# Patient Record
Sex: FEMALE | Race: WHITE | Employment: FULL TIME | URBAN - METROPOLITAN AREA
[De-identification: names, ages, dates, MRNs, and addresses within clinical notes are randomized per-mention and may not be internally consistent; named-entity substitution may affect disease eponyms.]

---

## 2022-03-02 ENCOUNTER — OFFICE VISIT (OUTPATIENT)
Dept: OBGYN CLINIC | Facility: CLINIC | Age: 32
End: 2022-03-02
Payer: COMMERCIAL

## 2022-03-02 VITALS
HEART RATE: 80 BPM | HEIGHT: 63 IN | DIASTOLIC BLOOD PRESSURE: 66 MMHG | BODY MASS INDEX: 24.8 KG/M2 | SYSTOLIC BLOOD PRESSURE: 102 MMHG | WEIGHT: 140 LBS

## 2022-03-02 DIAGNOSIS — N92.6 MISSED MENSES: Primary | ICD-10-CM

## 2022-03-02 LAB
CONTROL LINE PRESENT WITH A CLEAR BACKGROUND (YES/NO): YES YES/NO
PREGNANCY TEST, URINE: POSITIVE

## 2022-03-02 PROCEDURE — 3078F DIAST BP <80 MM HG: CPT | Performed by: ADVANCED PRACTICE MIDWIFE

## 2022-03-02 PROCEDURE — 3074F SYST BP LT 130 MM HG: CPT | Performed by: ADVANCED PRACTICE MIDWIFE

## 2022-03-02 PROCEDURE — 99203 OFFICE O/P NEW LOW 30 MIN: CPT | Performed by: ADVANCED PRACTICE MIDWIFE

## 2022-03-02 PROCEDURE — 81025 URINE PREGNANCY TEST: CPT | Performed by: ADVANCED PRACTICE MIDWIFE

## 2022-03-02 PROCEDURE — 3008F BODY MASS INDEX DOCD: CPT | Performed by: ADVANCED PRACTICE MIDWIFE

## 2022-03-02 RX ORDER — LEVOTHYROXINE SODIUM 0.05 MG/1
TABLET ORAL
COMMUNITY
Start: 2020-01-23

## 2022-03-03 ENCOUNTER — TELEPHONE (OUTPATIENT)
Dept: OBGYN CLINIC | Facility: CLINIC | Age: 32
End: 2022-03-03

## 2022-03-04 ENCOUNTER — MOBILE ENCOUNTER (OUTPATIENT)
Dept: OBGYN CLINIC | Facility: CLINIC | Age: 32
End: 2022-03-04

## 2022-03-04 LAB
HCG, TOTAL, QN: 8012 MIU/ML
PROGESTERONE: 12.3 NG/ML

## 2022-03-05 NOTE — PROGRESS NOTES
Paged by [email or web address] with stat Hcg results. Called patient to review results. Instructed her to repeat in 48 hours and placed order for repeat draw. Answered all patient questions.

## 2022-03-08 LAB — HCG, TOTAL, QN: ABNORMAL MIU/ML

## 2022-03-09 ENCOUNTER — TELEPHONE (OUTPATIENT)
Dept: OBGYN CLINIC | Facility: CLINIC | Age: 32
End: 2022-03-09

## 2022-03-09 ENCOUNTER — PATIENT MESSAGE (OUTPATIENT)
Dept: OBGYN CLINIC | Facility: CLINIC | Age: 32
End: 2022-03-09

## 2022-03-10 ENCOUNTER — HOSPITAL ENCOUNTER (OUTPATIENT)
Dept: ULTRASOUND IMAGING | Facility: HOSPITAL | Age: 32
Discharge: HOME | End: 2022-03-10
Attending: ADVANCED PRACTICE MIDWIFE
Payer: COMMERCIAL

## 2022-03-10 ENCOUNTER — OFFICE VISIT (OUTPATIENT)
Dept: OBGYN CLINIC | Facility: CLINIC | Age: 32
End: 2022-03-10
Payer: COMMERCIAL

## 2022-03-10 ENCOUNTER — LAB ENCOUNTER (OUTPATIENT)
Dept: LAB | Facility: HOSPITAL | Age: 32
End: 2022-03-10
Attending: ADVANCED PRACTICE MIDWIFE
Payer: COMMERCIAL

## 2022-03-10 VITALS
BODY MASS INDEX: 24.8 KG/M2 | WEIGHT: 140 LBS | DIASTOLIC BLOOD PRESSURE: 73 MMHG | SYSTOLIC BLOOD PRESSURE: 109 MMHG | HEART RATE: 66 BPM | HEIGHT: 63 IN

## 2022-03-10 DIAGNOSIS — O36.80X0 PREGNANCY WITH INCONCLUSIVE FETAL VIABILITY, SINGLE OR UNSPECIFIED FETUS: Primary | ICD-10-CM

## 2022-03-10 DIAGNOSIS — O26.891 ABDOMINAL PAIN DURING PREGNANCY IN FIRST TRIMESTER: ICD-10-CM

## 2022-03-10 DIAGNOSIS — N92.6 MISSED MENSES: ICD-10-CM

## 2022-03-10 DIAGNOSIS — R10.9 ABDOMINAL PAIN DURING PREGNANCY IN FIRST TRIMESTER: ICD-10-CM

## 2022-03-10 LAB — B-HCG SERPL-ACNC: ABNORMAL MIU/ML

## 2022-03-10 PROCEDURE — 3074F SYST BP LT 130 MM HG: CPT | Performed by: ADVANCED PRACTICE MIDWIFE

## 2022-03-10 PROCEDURE — 3008F BODY MASS INDEX DOCD: CPT | Performed by: ADVANCED PRACTICE MIDWIFE

## 2022-03-10 PROCEDURE — 76801 OB US < 14 WKS SINGLE FETUS: CPT | Performed by: ADVANCED PRACTICE MIDWIFE

## 2022-03-10 PROCEDURE — 36415 COLL VENOUS BLD VENIPUNCTURE: CPT | Performed by: ADVANCED PRACTICE MIDWIFE

## 2022-03-10 PROCEDURE — 84702 CHORIONIC GONADOTROPIN TEST: CPT | Performed by: ADVANCED PRACTICE MIDWIFE

## 2022-03-10 PROCEDURE — 3078F DIAST BP <80 MM HG: CPT | Performed by: ADVANCED PRACTICE MIDWIFE

## 2022-03-10 PROCEDURE — 76817 TRANSVAGINAL US OBSTETRIC: CPT | Performed by: ADVANCED PRACTICE MIDWIFE

## 2022-03-10 PROCEDURE — 99212 OFFICE O/P EST SF 10 MIN: CPT | Performed by: ADVANCED PRACTICE MIDWIFE

## 2022-03-10 NOTE — TELEPHONE ENCOUNTER
[phone] ([place])  [facility] pt can call cnm on call  Please call in am  Pt should have a CNM visit and hold and call ultrasound abnormal rising HCG  CNM on call notified of message plan and if pt is having pain she should go to an ER

## 2022-03-10 NOTE — TELEPHONE ENCOUNTER
Pt returned pc. Notified of abnormally rising HCG's. In typical normal pregnancy expect to double in 48 hrs. Not all pregnancies will double and can still be normal. Need to r/o potential causes of abnormal rise such as miscarriage/abnormally progressing pregnancy or ectopic. She report the pain she was having has gotten much better. No bleeding. ? Dates. Plan for hold & call tomorrow and to see CNM after. If severe pain to go to ER to r/o ectopic. Agrees with plan. To expect call from nurse tomorrow morning to arrange appointments. RN's- please coordinate hold & call for patient and CNM visit to follow.  Thanks MJ

## 2022-03-10 NOTE — TELEPHONE ENCOUNTER
Spoke to patient, per mj hold and call today. Appt set up for 12:00PM. Instructions given to patient.  Patient to see CNM at 1:00pm. Patient agrees with plan and verbally understands

## 2022-03-14 ENCOUNTER — TELEPHONE (OUTPATIENT)
Dept: OBGYN CLINIC | Facility: CLINIC | Age: 32
End: 2022-03-14

## 2022-03-14 NOTE — TELEPHONE ENCOUNTER
Phone call to patient. Reports she has not had any bleeding or spotting. Still has occasional cramping but nothing that is sustained. She has not scheduled her ultrasound so instructed her to do so for sometime next week. She is to repeat Hcg this week.

## 2022-03-17 ENCOUNTER — TELEPHONE (OUTPATIENT)
Dept: OBGYN CLINIC | Facility: CLINIC | Age: 32
End: 2022-03-17

## 2022-03-17 LAB — HCG, TOTAL, QN: ABNORMAL MIU/ML

## 2022-03-17 NOTE — TELEPHONE ENCOUNTER
Spoke with pt advised of results and KEIKO's rec's. Order for ultrasound changed to stat. Pt agreed and voiced understanding.

## 2022-03-17 NOTE — TELEPHONE ENCOUNTER
----- Message from Wilma Duran CNM sent at 3/17/2022  3:21 PM CDT -----  Please call patient. Her Hcg continues to rise, though not as quickly as we would expect. She should repeat Hcg tomorrow so we can see the 48 hour trend. It looks like she has a follow-up ultrasound scheduled for 3/31. I would ideally like her to go next week, as her last one was 3/10. Please review warning signs. Thanks!

## 2022-03-19 LAB — HCG, TOTAL, QN: ABNORMAL MIU/ML

## 2022-03-22 ENCOUNTER — TELEPHONE (OUTPATIENT)
Dept: OBGYN CLINIC | Facility: CLINIC | Age: 32
End: 2022-03-22

## 2022-03-22 ENCOUNTER — MOBILE ENCOUNTER (OUTPATIENT)
Dept: OBGYN CLINIC | Facility: CLINIC | Age: 32
End: 2022-03-22

## 2022-03-22 NOTE — PROGRESS NOTES
Called patient the review Hcg results. Informed her that level is dropping which indicates a loss. She has an ultrasound today at 2pm and is wondering if she should proceed. I informed her that it would not give us much more information so she does not need to. Patient should go for repeat Hcg and blood type/screen. She is going to discuss with her  and then call office with plan. Reviewed management options. Will discuss further  after next Hcg level or ultrasound. Answered all patient questions.

## 2022-03-22 NOTE — TELEPHONE ENCOUNTER
Per Nedra Angelo, pt's hcg levels are dropping and pt will be calling office to notify if she has decided to proceed with ultrasound scheduled today. Per Nedra Angelo, pt needs to go to lab to repeat bhcg and will also need blood type and antibody screen. Lab orders placed.

## 2022-03-23 LAB — HCG, TOTAL, QN: ABNORMAL MIU/ML

## 2022-03-23 NOTE — TELEPHONE ENCOUNTER
KEIKO lmtcb. Pt will need Rhogam when bleeding.  Should schedule virtual or in person visit today to discuss options w/ cytotec

## 2022-03-23 NOTE — TELEPHONE ENCOUNTER
----- Message from Kevan Lopez CNM sent at 3/23/2022 10:46 AM CDT -----  I tried calling patient and left a voicemail for her to call back. Hcg continues to fall. I had discussed options with her regarding expectant management versus medical yesterday. She was not sure what she wanted to do. She is Rh negative so will need rhogam, either within 72 hours of bleeding or prior to taking miso.

## 2022-03-23 NOTE — TELEPHONE ENCOUNTER
Discussed plan of care & KEIKO recs with pt. Offered virtual or in-person visit today. Pt desires virtual visit w/ KEIKO tomorrow, appt scheduled & instructions given. Emphasized importance of calling office w/ any bleeding & that rhogam should be administered within 72 hours.  Pt verbalized an understanding & agrees w/ plan

## 2022-03-24 ENCOUNTER — TELEMEDICINE (OUTPATIENT)
Dept: OBGYN CLINIC | Facility: CLINIC | Age: 32
End: 2022-03-24

## 2022-03-24 DIAGNOSIS — O26.899 RH NEGATIVE STATE IN ANTEPARTUM PERIOD: ICD-10-CM

## 2022-03-24 DIAGNOSIS — Z67.91 RH NEGATIVE STATE IN ANTEPARTUM PERIOD: ICD-10-CM

## 2022-03-24 DIAGNOSIS — O03.9 MISCARRIAGE: Primary | ICD-10-CM

## 2022-03-24 PROBLEM — E03.9 HYPOTHYROIDISM: Status: ACTIVE | Noted: 2022-03-24

## 2022-03-24 PROCEDURE — 99213 OFFICE O/P EST LOW 20 MIN: CPT | Performed by: ADVANCED PRACTICE MIDWIFE

## 2022-03-24 RX ORDER — MISOPROSTOL 200 UG/1
800 TABLET ORAL ONCE
Qty: 8 TABLET | Refills: 0 | Status: SHIPPED | OUTPATIENT
Start: 2022-03-24 | End: 2022-03-24

## 2022-03-24 NOTE — PROGRESS NOTES
I conducted a telehealth visit with Christina Canela, which was completed using two-way, real-time interactive audio and video communication. This has been done in good burt to provide continuity of care in the best interest of the provider-patient relationship, due to the COVID -19 public health crisis/national emergency where restrictions of face-to-face office visits are ongoing. Every conscious effort was taken to allow for sufficient and adequate time to complete the visit. The patient was made aware of the limitations of the telehealth visit, including treatment limitations as no physical exam could be performed. The patient was advised to call 911 or to go to the ER in case there was an emergency. The patient was also advised of the potential privacy & security concerns related to the telehealth platform. The patient was made aware of where to find [facility] notice of privacy practices, telehealth consent form and other related consent forms and documents. which are located on the [facility] website. The patient verbally agreed to telehealth consent form, related consents and the risks discussed. Lastly, the patient confirmed that they were in [place]. HPI: Video visit conducted with Christina Canela and her . Christina Canela has been followed, initially for abnormally rising Hcg and now falling Hcg. Had ultrasound on 3/10/22 that showed intrauterine gestational sac and yolk sac but no fetal pole. She had some like brown spotting yesterday morning. Denies additional bleeding/spotting, pelvic pain, or fevers. Review of symptoms: See HPI    Physical exam:  Constitutional: Healthy appearing  Neuro: Alert and oriented x 3  Psych: Normal mood. Normal affect. Assessment and Plan:  Missed miscarriage  Rh negative    Counseled on reasons for miscarriage are typically genetic or chromosomal abnormalities that are incompatible with life. Patient counseled on expectant management versus medical management versus D&C.  Patient desires medical management. ional pills. MiscarriaPatient instructed to wet 4 misoprostol pills and insert into vagina. Will likely start cramping and bleeding within 4 hours. If no cramping or bleeding within 4 hours, repeat process with 4 additge will likely involve a small period 2 hours or so of heavy bleeding and cramping combined. Danger signs: heavy bleeding, fever chills, severe pain - to ER for evaluation. Patient verbalized understanding. Follow HCGs weekly until normal.     Discussed recommendation for Rhogam due to Rh negative status. Because ultrasound dated her at 5wks, recommendations are mixed due to low likelihood of mixing of blood types but there is also no harm in Rhogam. Patient instructed to call when she starts bleeding, to schedule nurse visit for rhogam within 72 hours of bleeding. All questions answered 25 mins time in face to face consultation & coordination of care.

## 2022-03-25 ENCOUNTER — TELEPHONE (OUTPATIENT)
Dept: OBGYN CLINIC | Facility: CLINIC | Age: 32
End: 2022-03-25

## 2022-03-25 NOTE — TELEPHONE ENCOUNTER
Spoke to patient, stated she took cytotec last night around 10:00pm and started bleeding at 2:00am. Patient stated her bleeding was heavy with clots. Patient wanted to know if she should take second dose that was prescribed. Advised she does not take the second dose since she started to have heavy bleeding with clots. Patient is coming in tomorrow for Rhogam. Patient advised per mj, she is to repeat hcg levels tomorrow and CNM visit next week along with repeat levels. Standing order placed and appt scheduled with LONNIE on 3/31.  Patient agrees with plan and verbally understands

## 2022-03-25 NOTE — TELEPHONE ENCOUNTER
Pt took subprostile? For miscarriage. Started bleeding 2am on 3/25. Pt has questions.     Please advise

## 2022-03-26 ENCOUNTER — TELEPHONE (OUTPATIENT)
Dept: OBGYN CLINIC | Facility: CLINIC | Age: 32
End: 2022-03-26

## 2022-03-26 ENCOUNTER — NURSE ONLY (OUTPATIENT)
Dept: OBGYN CLINIC | Facility: CLINIC | Age: 32
End: 2022-03-26
Payer: COMMERCIAL

## 2022-03-26 ENCOUNTER — LAB ENCOUNTER (OUTPATIENT)
Dept: LAB | Facility: HOSPITAL | Age: 32
End: 2022-03-26
Attending: ADVANCED PRACTICE MIDWIFE
Payer: COMMERCIAL

## 2022-03-26 DIAGNOSIS — O03.9 SPONTANEOUS ABORTION: Primary | ICD-10-CM

## 2022-03-26 DIAGNOSIS — O02.1 MISSED ABORTION: ICD-10-CM

## 2022-03-26 LAB — B-HCG SERPL-ACNC: 3365 MIU/ML

## 2022-03-26 PROCEDURE — 96372 THER/PROPH/DIAG INJ SC/IM: CPT | Performed by: ADVANCED PRACTICE MIDWIFE

## 2022-03-26 PROCEDURE — 36415 COLL VENOUS BLD VENIPUNCTURE: CPT

## 2022-03-26 PROCEDURE — 84702 CHORIONIC GONADOTROPIN TEST: CPT

## 2022-03-26 NOTE — TELEPHONE ENCOUNTER
Stat Quant results called to office. routed to [facility] on call. Pt had Rhogam inj today. Bleeding has subsided.  Has f/u appt 3/31 w/ MBW & repeat quant

## 2022-03-26 NOTE — PROGRESS NOTES
Prenatal patient in today for Rhogam injection for miscarriage. Had cramping & bleeding yesterday. Passed a large clot & bleeding subsided & cramping resolved. Patient is 8w1d today. No Ultrasound needed. Injection given right ventrogluteal. Patient tolerated well. Rhogam information sheet provided. Patient has follow up appointment scheduled next week. Reviewed SAB precautions. Patient instructed to contact office with any questions or concerns. Patient verbalized understanding.

## 2022-03-28 ENCOUNTER — TELEPHONE (OUTPATIENT)
Dept: OBGYN CLINIC | Facility: CLINIC | Age: 32
End: 2022-03-28

## 2022-03-28 ENCOUNTER — LAB ENCOUNTER (OUTPATIENT)
Dept: LAB | Facility: HOSPITAL | Age: 32
End: 2022-03-28
Attending: ADVANCED PRACTICE MIDWIFE
Payer: COMMERCIAL

## 2022-03-28 ENCOUNTER — OFFICE VISIT (OUTPATIENT)
Dept: OBGYN CLINIC | Facility: CLINIC | Age: 32
End: 2022-03-28
Payer: COMMERCIAL

## 2022-03-28 VITALS
SYSTOLIC BLOOD PRESSURE: 129 MMHG | DIASTOLIC BLOOD PRESSURE: 77 MMHG | HEIGHT: 63 IN | BODY MASS INDEX: 24.03 KG/M2 | WEIGHT: 135.63 LBS | HEART RATE: 51 BPM

## 2022-03-28 DIAGNOSIS — O02.1 MISSED ABORTION: Primary | ICD-10-CM

## 2022-03-28 DIAGNOSIS — R10.2 RIGHT ADNEXAL TENDERNESS: ICD-10-CM

## 2022-03-28 LAB — B-HCG SERPL-ACNC: 1170 MIU/ML

## 2022-03-28 PROCEDURE — 3008F BODY MASS INDEX DOCD: CPT | Performed by: ADVANCED PRACTICE MIDWIFE

## 2022-03-28 PROCEDURE — 99213 OFFICE O/P EST LOW 20 MIN: CPT | Performed by: ADVANCED PRACTICE MIDWIFE

## 2022-03-28 PROCEDURE — 36415 COLL VENOUS BLD VENIPUNCTURE: CPT | Performed by: ADVANCED PRACTICE MIDWIFE

## 2022-03-28 PROCEDURE — 84702 CHORIONIC GONADOTROPIN TEST: CPT | Performed by: ADVANCED PRACTICE MIDWIFE

## 2022-03-28 PROCEDURE — 3074F SYST BP LT 130 MM HG: CPT | Performed by: ADVANCED PRACTICE MIDWIFE

## 2022-03-28 PROCEDURE — 3078F DIAST BP <80 MM HG: CPT | Performed by: ADVANCED PRACTICE MIDWIFE

## 2022-03-28 NOTE — TELEPHONE ENCOUNTER
----- Message from Fabienne Christian CNM sent at 3/28/2022  4:54 PM CDT -----  HCG is dropping - needs repeat ultrasound due to right adnexal pain.  Will need scheduled for tomorrow - please call and assist with scheduling

## 2022-03-28 NOTE — TELEPHONE ENCOUNTER
Spoke with pt advised of lab results and MBW's rec's. Ultrasound order entered as STAT. Pt given phone number for central scheduling. Pt agreed and voiced understanding.

## 2022-03-28 NOTE — TELEPHONE ENCOUNTER
----- Message from Naila Leung CNM sent at 3/28/2022  1:48 PM CDT -----  HCG dropped    She was medically managing her missed ab she should come I  for an appt with a CYNTHIA this week

## 2022-03-29 ENCOUNTER — HOSPITAL ENCOUNTER (OUTPATIENT)
Dept: ULTRASOUND IMAGING | Facility: HOSPITAL | Age: 32
Discharge: HOME | End: 2022-03-29
Attending: ADVANCED PRACTICE MIDWIFE
Payer: COMMERCIAL

## 2022-03-29 DIAGNOSIS — R10.2 RIGHT ADNEXAL TENDERNESS: ICD-10-CM

## 2022-03-29 PROCEDURE — 76817 TRANSVAGINAL US OBSTETRIC: CPT | Performed by: ADVANCED PRACTICE MIDWIFE

## 2022-03-29 PROCEDURE — 76801 OB US < 14 WKS SINGLE FETUS: CPT | Performed by: ADVANCED PRACTICE MIDWIFE

## 2022-03-30 ENCOUNTER — TELEPHONE (OUTPATIENT)
Dept: OBGYN CLINIC | Facility: CLINIC | Age: 32
End: 2022-03-30

## 2022-03-30 NOTE — TELEPHONE ENCOUNTER
Notified pt of results & instructions per MBW. Pt reports bleeding is now like day 4 of her period with one raúl sized clot & still cramping. Instructed to take ibuprofen as needed & notify office if cramping &/or worsens or does not resolve.  Pt verbalized an understanding & agrees w/ plan

## 2022-03-30 NOTE — TELEPHONE ENCOUNTER
----- Message from Sierra Saul CNM sent at 3/30/2022  9:50 AM CDT -----  Looks like normal results and everything has passed. Anticipate resolution of cramping. Please call.

## 2022-04-01 ENCOUNTER — TELEPHONE (OUTPATIENT)
Dept: OBGYN CLINIC | Facility: CLINIC | Age: 32
End: 2022-04-01

## 2022-04-01 LAB — HCG, TOTAL, QN: 508 MIU/ML

## 2022-04-01 NOTE — TELEPHONE ENCOUNTER
----- Message from Terra Tomas CNM sent at 4/1/2022 12:33 PM CDT -----  Dropped HCG to 500 repeat in 1 - 2 weeks  Check to see how se is oing  Offer an appt if needed

## 2022-04-01 NOTE — TELEPHONE ENCOUNTER
Notified pt of results & instructions per MES. Pt states she completed labs only because she was doing others at that time. Pt states her bleeding has almost stopped & her cramping has disappeared. Declines f/u appt for now but will call w/ any concerns.  Pt verbalized an understanding & agrees w/ plan

## 2022-04-13 ENCOUNTER — TELEPHONE (OUTPATIENT)
Dept: OBGYN CLINIC | Facility: CLINIC | Age: 32
End: 2022-04-13

## 2022-04-13 NOTE — TELEPHONE ENCOUNTER
Patient is at [facility] now, requesting order for lab to be send through Fax [phone],[employer].

## 2022-04-14 ENCOUNTER — TELEPHONE (OUTPATIENT)
Dept: OBGYN CLINIC | Facility: CLINIC | Age: 32
End: 2022-04-14

## 2022-04-14 LAB — HCG, TOTAL, QN: 7 MIU/ML

## 2022-04-14 NOTE — TELEPHONE ENCOUNTER
----- Message from Byron Lanier CNM sent at 4/14/2022  6:37 AM CDT -----  HCG down to 7 less than 5 is normal  repeat in 2 weeks

## 2022-04-15 NOTE — TELEPHONE ENCOUNTER
Notified pt of results & instructions per MES.  Order placed at [facility]. Pt verbalized an understanding & agrees w/ plan

## 2022-05-16 ENCOUNTER — TELEPHONE (OUTPATIENT)
Dept: OBGYN CLINIC | Facility: CLINIC | Age: 32
End: 2022-05-16

## 2022-05-16 NOTE — TELEPHONE ENCOUNTER
Spoke with pt and reminded pt of overdue bhcg. Pt agreed and voiced understanding, stated she will go to [facility] to complete.

## 2022-06-16 ENCOUNTER — TELEPHONE (OUTPATIENT)
Dept: OBGYN CLINIC | Facility: CLINIC | Age: 32
End: 2022-06-16

## 2022-07-19 ENCOUNTER — TELEPHONE (OUTPATIENT)
Dept: OBGYN CLINIC | Facility: CLINIC | Age: 32
End: 2022-07-19

## 2022-08-08 ENCOUNTER — TELEPHONE (OUTPATIENT)
Dept: OBGYN CLINIC | Facility: CLINIC | Age: 32
End: 2022-08-08

## 2022-08-08 ENCOUNTER — LAB ENCOUNTER (OUTPATIENT)
Dept: LAB | Age: 32
End: 2022-08-08
Attending: ADVANCED PRACTICE MIDWIFE
Payer: COMMERCIAL

## 2022-08-08 ENCOUNTER — OFFICE VISIT (OUTPATIENT)
Dept: OBGYN CLINIC | Facility: CLINIC | Age: 32
End: 2022-08-08
Payer: COMMERCIAL

## 2022-08-08 VITALS
SYSTOLIC BLOOD PRESSURE: 120 MMHG | DIASTOLIC BLOOD PRESSURE: 75 MMHG | HEART RATE: 65 BPM | BODY MASS INDEX: 24.45 KG/M2 | WEIGHT: 138 LBS | HEIGHT: 63 IN

## 2022-08-08 DIAGNOSIS — Z83.2 FAMILY HISTORY OF FACTOR V LEIDEN MUTATION: ICD-10-CM

## 2022-08-08 DIAGNOSIS — Z87.59 HISTORY OF MISCARRIAGE: ICD-10-CM

## 2022-08-08 DIAGNOSIS — N92.6 MISSED MENSES: ICD-10-CM

## 2022-08-08 DIAGNOSIS — N92.6 MISSED MENSES: Primary | ICD-10-CM

## 2022-08-08 LAB
B-HCG SERPL-ACNC: ABNORMAL MIU/ML
CONTROL LINE PRESENT WITH A CLEAR BACKGROUND (YES/NO): YES YES/NO
PREGNANCY TEST, URINE: POSITIVE
PROGEST SERPL-MCNC: 16.2 NG/ML

## 2022-08-08 PROCEDURE — 99213 OFFICE O/P EST LOW 20 MIN: CPT | Performed by: ADVANCED PRACTICE MIDWIFE

## 2022-08-08 PROCEDURE — 3008F BODY MASS INDEX DOCD: CPT | Performed by: ADVANCED PRACTICE MIDWIFE

## 2022-08-08 PROCEDURE — 3074F SYST BP LT 130 MM HG: CPT | Performed by: ADVANCED PRACTICE MIDWIFE

## 2022-08-08 PROCEDURE — 84144 ASSAY OF PROGESTERONE: CPT | Performed by: ADVANCED PRACTICE MIDWIFE

## 2022-08-08 PROCEDURE — 3078F DIAST BP <80 MM HG: CPT | Performed by: ADVANCED PRACTICE MIDWIFE

## 2022-08-08 PROCEDURE — 81025 URINE PREGNANCY TEST: CPT | Performed by: ADVANCED PRACTICE MIDWIFE

## 2022-08-08 PROCEDURE — 36415 COLL VENOUS BLD VENIPUNCTURE: CPT

## 2022-08-08 PROCEDURE — 84702 CHORIONIC GONADOTROPIN TEST: CPT

## 2022-08-08 RX ORDER — CHOLECALCIFEROL (VITAMIN D3) 25 MCG
1 TABLET,CHEWABLE ORAL DAILY
COMMUNITY

## 2022-08-08 NOTE — TELEPHONE ENCOUNTER
----- Message from Ben Cho CNM sent at 8/8/2022  4:03 PM CDT -----  Please call patient and let her know progesterone level is good. She needs to repeat Hcg in 48 hours and can schedule an ultrasound. I will also place an order for her to get tested for factor V leiden. She can go to any lab, though her insurance prefers [employer]. Thanks!

## 2022-08-11 ENCOUNTER — TELEPHONE (OUTPATIENT)
Dept: OBGYN CLINIC | Facility: CLINIC | Age: 32
End: 2022-08-11

## 2022-08-11 ENCOUNTER — LAB ENCOUNTER (OUTPATIENT)
Dept: LAB | Facility: HOSPITAL | Age: 32
End: 2022-08-11
Attending: ADVANCED PRACTICE MIDWIFE
Payer: COMMERCIAL

## 2022-08-11 ENCOUNTER — HOSPITAL ENCOUNTER (OUTPATIENT)
Dept: ULTRASOUND IMAGING | Facility: HOSPITAL | Age: 32
End: 2022-08-11
Attending: ADVANCED PRACTICE MIDWIFE
Payer: COMMERCIAL

## 2022-08-11 ENCOUNTER — HOSPITAL ENCOUNTER (OUTPATIENT)
Dept: ULTRASOUND IMAGING | Facility: HOSPITAL | Age: 32
Discharge: HOME | End: 2022-08-11
Attending: ADVANCED PRACTICE MIDWIFE
Payer: COMMERCIAL

## 2022-08-11 DIAGNOSIS — Z87.59 HISTORY OF MISCARRIAGE: ICD-10-CM

## 2022-08-11 DIAGNOSIS — N92.6 MISSED MENSES: Primary | ICD-10-CM

## 2022-08-11 DIAGNOSIS — N92.6 MISSED MENSES: ICD-10-CM

## 2022-08-11 LAB
ALBUMIN SERPL-MCNC: 3.6 G/DL (ref 3.4–5)
ALBUMIN/GLOB SERPL: 1.1 {RATIO} (ref 1–2)
ALP LIVER SERPL-CCNC: 43 U/L
ALT SERPL-CCNC: 27 U/L
ANION GAP SERPL CALC-SCNC: 8 MMOL/L (ref 0–18)
AST SERPL-CCNC: 18 U/L (ref 15–37)
B-HCG SERPL-ACNC: ABNORMAL MIU/ML
BILIRUB SERPL-MCNC: 0.4 MG/DL (ref 0.1–2)
BUN BLD-MCNC: 8 MG/DL (ref 7–18)
BUN/CREAT SERPL: 13.8 (ref 10–20)
CALCIUM BLD-MCNC: 8.9 MG/DL (ref 8.5–10.1)
CHLORIDE SERPL-SCNC: 106 MMOL/L (ref 98–112)
CO2 SERPL-SCNC: 25 MMOL/L (ref 21–32)
CREAT BLD-MCNC: 0.58 MG/DL
F5 P.R506Q BLD/T QL: NORMAL
FASTING STATUS PATIENT QL REPORTED: NO
GFR SERPLBLD BASED ON 1.73 SQ M-ARVRAT: 125 ML/MIN/1.73M2 (ref 60–?)
GLOBULIN PLAS-MCNC: 3.2 G/DL (ref 2.8–4.4)
GLUCOSE BLD-MCNC: 108 MG/DL (ref 70–99)
OSMOLALITY SERPL CALC.SUM OF ELEC: 287 MOSM/KG (ref 275–295)
POTASSIUM SERPL-SCNC: 3.3 MMOL/L (ref 3.5–5.1)
PROT SERPL-MCNC: 6.8 G/DL (ref 6.4–8.2)
SODIUM SERPL-SCNC: 139 MMOL/L (ref 136–145)

## 2022-08-11 PROCEDURE — 36415 COLL VENOUS BLD VENIPUNCTURE: CPT | Performed by: ADVANCED PRACTICE MIDWIFE

## 2022-08-11 PROCEDURE — 76801 OB US < 14 WKS SINGLE FETUS: CPT | Performed by: ADVANCED PRACTICE MIDWIFE

## 2022-08-11 PROCEDURE — 81241 F5 GENE: CPT | Performed by: ADVANCED PRACTICE MIDWIFE

## 2022-08-11 PROCEDURE — 84702 CHORIONIC GONADOTROPIN TEST: CPT

## 2022-08-11 PROCEDURE — 80053 COMPREHEN METABOLIC PANEL: CPT | Performed by: ADVANCED PRACTICE MIDWIFE

## 2022-08-11 PROCEDURE — 76817 TRANSVAGINAL US OBSTETRIC: CPT | Performed by: ADVANCED PRACTICE MIDWIFE

## 2022-08-11 NOTE — PROGRESS NOTES
Phone call to patient to review Hcg levels. Levels did not double over 48-72 hours but did rise. (38,226 to 56,421). Discussed high level of Hcg and that they do not always double at this level. Stat ultrasound ordered for further evaluation. Patient instructed to call to schedule.

## 2022-08-11 NOTE — TELEPHONE ENCOUNTER
----- Message from Nancy Khan CNM sent at 8/11/2022  4:37 PM CDT -----  Please call patient and let her know they did draw factor v Leiden today and it was normal. I already called her about other lab results.

## 2022-08-12 ENCOUNTER — TELEPHONE (OUTPATIENT)
Dept: OBGYN CLINIC | Facility: CLINIC | Age: 32
End: 2022-08-12

## 2022-08-12 NOTE — TELEPHONE ENCOUNTER
Phone call to patient to review normal ultrasound with IUP at 6.2wks. NABILA 4/4/23 based on ultrasound and consistent with LMP with 30 day cycles. Patient has nurse education visit tomorrow. Reviewed warning signs and when to call.

## 2022-09-01 ENCOUNTER — NURSE ONLY (OUTPATIENT)
Dept: OBGYN CLINIC | Facility: CLINIC | Age: 32
End: 2022-09-01

## 2022-09-01 DIAGNOSIS — Z34.81 ENCOUNTER FOR SUPERVISION OF OTHER NORMAL PREGNANCY IN FIRST TRIMESTER: Primary | ICD-10-CM

## 2022-09-01 RX ORDER — CHLORAL HYDRATE 500 MG
CAPSULE ORAL
COMMUNITY

## 2022-09-01 NOTE — PROGRESS NOTES
Phone OB RN education visit completed, educational material reviewed. Pt verbalized understanding. Orders placed for NOB labs. Pt states her last Pap smear was in  and was abnormal. Pt undecided about genetic screening. Pt will complete EPDS and ROOPA-7 during next appt. Pt was scheduled for NOB appt. Pt desires natural birth. Pt. Has answered NO 5P questions and has NO  risk factors. Pt. Given What pregnant women need to know handout. Pt counseled on ACOG & ACNM guidelines recommending carrier testing. Carrier Testing, including Hemoglobin Evaluation offered through patient & insurance choice of [employer] or [employer]. Pt undecided.

## 2022-09-09 ENCOUNTER — PATIENT MESSAGE (OUTPATIENT)
Dept: OBGYN CLINIC | Facility: CLINIC | Age: 32
End: 2022-09-09

## 2022-09-09 DIAGNOSIS — Z34.80 PRENATAL CARE OF MULTIGRAVIDA, ANTEPARTUM: Primary | ICD-10-CM

## 2022-09-13 NOTE — TELEPHONE ENCOUNTER
All questions answered. Pt decided to complete FTS. Call transferred to [facility].  Pt verbalized an understanding & agrees w/ plan

## 2022-09-14 ENCOUNTER — TELEMEDICINE (OUTPATIENT)
Dept: OBGYN CLINIC | Facility: CLINIC | Age: 32
End: 2022-09-14

## 2022-09-14 DIAGNOSIS — Z34.81 PRENATAL CARE, SUBSEQUENT PREGNANCY IN FIRST TRIMESTER: Primary | ICD-10-CM

## 2022-09-14 PROBLEM — U07.1 COVID-19 AFFECTING PREGNANCY IN FIRST TRIMESTER: Status: ACTIVE | Noted: 2022-09-14

## 2022-09-14 PROBLEM — O98.511 COVID-19 AFFECTING PREGNANCY IN FIRST TRIMESTER: Status: ACTIVE | Noted: 2022-09-14

## 2022-09-14 NOTE — PROGRESS NOTES
This visit is conducted using Telemedicine with live, interactive video and audio. Patient has elected a telehealth video visit and consents to care by this method today. Pt is at home and provider is in [facility] office. No other persons participated in the telehealth service today. Patient has been referred to the [facility] website at [email or web address].org/consents to review the yearly Consent to Treat document. Patient understands and accepts financial responsibility for any deductible, co-insurance and/or co-pays associated with this service. Duration of telehealth visit was 11 minutes. In total, 15 minutes of face-to-face counseling, chart review, orders and coordination of care. Miladis Salazar is present by video today because she tested positive for COVID. States her symptoms are like a bad cold. No fever. No additional SOB beyond what pregnancy has already brought on. She is isolating from her . No other household members. She is taking vitamin D and C. She will add Zinc and Melatonin. Patient Active Problem List:     Hypothyroid     COVID-19 affecting pregnancy in first trimester       Physical Exam:  General:  Alert and oriented, No acute distress  Respiratory:  Non-labor respirations  Psychiatric: Cooperative, appropriate mood and affect    Today's plan: Comfort measures reviewed. To ED if SOB  Next visit: 4 weeks    Pt verbalized understanding. All questions answered.  No barriers to learning identified

## 2022-09-23 ENCOUNTER — HOSPITAL ENCOUNTER (OUTPATIENT)
Dept: PERINATAL CARE | Facility: HOSPITAL | Age: 32
Discharge: HOME | End: 2022-09-23
Attending: OBSTETRICS & GYNECOLOGY

## 2022-09-23 ENCOUNTER — HOSPITAL ENCOUNTER (OUTPATIENT)
Dept: PERINATAL CARE | Facility: HOSPITAL | Age: 32
Discharge: HOME | End: 2022-09-23
Attending: ADVANCED PRACTICE MIDWIFE

## 2022-09-23 VITALS
BODY MASS INDEX: 25 KG/M2 | SYSTOLIC BLOOD PRESSURE: 131 MMHG | WEIGHT: 140 LBS | HEART RATE: 66 BPM | DIASTOLIC BLOOD PRESSURE: 81 MMHG

## 2022-09-23 DIAGNOSIS — O98.511 COVID-19 AFFECTING PREGNANCY IN FIRST TRIMESTER: ICD-10-CM

## 2022-09-23 DIAGNOSIS — U07.1 COVID-19 AFFECTING PREGNANCY IN FIRST TRIMESTER: ICD-10-CM

## 2022-09-23 DIAGNOSIS — E03.9 HYPOTHYROIDISM, UNSPECIFIED TYPE: ICD-10-CM

## 2022-09-23 DIAGNOSIS — Z36.9 1ST TRIMESTER SCREENING: Primary | ICD-10-CM

## 2022-09-23 DIAGNOSIS — Z36.9 1ST TRIMESTER SCREENING: ICD-10-CM

## 2022-09-23 PROCEDURE — 36415 COLL VENOUS BLD VENIPUNCTURE: CPT

## 2022-09-23 PROCEDURE — 76813 OB US NUCHAL MEAS 1 GEST: CPT | Performed by: OBSTETRICS & GYNECOLOGY

## 2022-09-28 ENCOUNTER — ROUTINE PRENATAL (OUTPATIENT)
Dept: OBGYN CLINIC | Facility: CLINIC | Age: 32
End: 2022-09-28

## 2022-09-28 VITALS
HEART RATE: 60 BPM | SYSTOLIC BLOOD PRESSURE: 115 MMHG | BODY MASS INDEX: 25 KG/M2 | WEIGHT: 141 LBS | DIASTOLIC BLOOD PRESSURE: 60 MMHG

## 2022-09-28 DIAGNOSIS — Z34.81 ENCOUNTER FOR SUPERVISION OF OTHER NORMAL PREGNANCY IN FIRST TRIMESTER: Primary | ICD-10-CM

## 2022-09-28 PROCEDURE — 3078F DIAST BP <80 MM HG: CPT | Performed by: ADVANCED PRACTICE MIDWIFE

## 2022-09-28 PROCEDURE — 3074F SYST BP LT 130 MM HG: CPT | Performed by: ADVANCED PRACTICE MIDWIFE

## 2022-09-29 NOTE — PROGRESS NOTES
NOB teaching and labs reviewed MFM report and recommendations reviewed  Pt will be seeing PCP - re thyroid   Warning signs reviewed and all questions answered

## 2022-10-03 ENCOUNTER — TELEPHONE (OUTPATIENT)
Dept: PERINATAL CARE | Facility: HOSPITAL | Age: 32
End: 2022-10-03

## 2022-10-03 NOTE — TELEPHONE ENCOUNTER
Panorama screening results obt  Reviewed by DR Justo Montaño    Results:  low risk  Low Risk for Aneuploidies, triploidy and Monosomy X  Fetal Sex: Held for   Fetal Fraction: 12.6%     LVMW#TCB   Copy of results sent for scanning into pt record

## 2022-10-04 ENCOUNTER — TELEPHONE (OUTPATIENT)
Dept: PERINATAL CARE | Facility: HOSPITAL | Age: 32
End: 2022-10-04

## 2022-10-04 ENCOUNTER — TELEPHONE (OUTPATIENT)
Dept: OBGYN CLINIC | Facility: CLINIC | Age: 32
End: 2022-10-04

## 2022-10-05 NOTE — TELEPHONE ENCOUNTER
Spoke to patient, advised overdue labs. Patient stated she was not aware she had to have any labs done and was waiting for someone to give instructions. Patient advised orders already in the system and does not require any appt. Patient on vacation this week but will do next week once she is back.  Patient agrees with plan and verbally understands

## 2022-10-10 ENCOUNTER — LAB ENCOUNTER (OUTPATIENT)
Dept: LAB | Facility: HOSPITAL | Age: 32
End: 2022-10-10
Attending: ADVANCED PRACTICE MIDWIFE
Payer: COMMERCIAL

## 2022-10-10 DIAGNOSIS — Z34.81 ENCOUNTER FOR SUPERVISION OF OTHER NORMAL PREGNANCY IN FIRST TRIMESTER: ICD-10-CM

## 2022-10-10 LAB
ALBUMIN SERPL-MCNC: 3.1 G/DL (ref 3.4–5)
ALBUMIN/GLOB SERPL: 0.8 {RATIO} (ref 1–2)
ALP LIVER SERPL-CCNC: 62 U/L
ALT SERPL-CCNC: 24 U/L
ANION GAP SERPL CALC-SCNC: 11 MMOL/L (ref 0–18)
ANTIBODY SCREEN: NEGATIVE
AST SERPL-CCNC: 18 U/L (ref 15–37)
BASOPHILS # BLD AUTO: 0.02 X10(3) UL (ref 0–0.2)
BASOPHILS NFR BLD AUTO: 0.3 %
BILIRUB SERPL-MCNC: 0.6 MG/DL (ref 0.1–2)
BUN BLD-MCNC: 5 MG/DL (ref 7–18)
BUN/CREAT SERPL: 9.1 (ref 10–20)
CALCIUM BLD-MCNC: 8.9 MG/DL (ref 8.5–10.1)
CHLORIDE SERPL-SCNC: 108 MMOL/L (ref 98–112)
CO2 SERPL-SCNC: 19 MMOL/L (ref 21–32)
CREAT BLD-MCNC: 0.55 MG/DL
DEPRECATED HBV CORE AB SER IA-ACNC: 48.4 NG/ML
DEPRECATED RDW RBC AUTO: 44.9 FL (ref 35.1–46.3)
EOSINOPHIL # BLD AUTO: 0.07 X10(3) UL (ref 0–0.7)
EOSINOPHIL NFR BLD AUTO: 0.9 %
ERYTHROCYTE [DISTWIDTH] IN BLOOD BY AUTOMATED COUNT: 14 % (ref 11–15)
FASTING STATUS PATIENT QL REPORTED: NO
GFR SERPLBLD BASED ON 1.73 SQ M-ARVRAT: 126 ML/MIN/1.73M2 (ref 60–?)
GLOBULIN PLAS-MCNC: 3.7 G/DL (ref 2.8–4.4)
GLUCOSE BLD-MCNC: 118 MG/DL (ref 70–99)
HBV SURFACE AG SER-ACNC: <0.1 [IU]/L
HBV SURFACE AG SERPL QL IA: NONREACTIVE
HCT VFR BLD AUTO: 42.6 %
HCV AB SERPL QL IA: NONREACTIVE
HGB BLD-MCNC: 14.3 G/DL
IMM GRANULOCYTES # BLD AUTO: 0.03 X10(3) UL (ref 0–1)
IMM GRANULOCYTES NFR BLD: 0.4 %
LYMPHOCYTES # BLD AUTO: 1.35 X10(3) UL (ref 1–4)
LYMPHOCYTES NFR BLD AUTO: 17.3 %
MCH RBC QN AUTO: 29.4 PG (ref 26–34)
MCHC RBC AUTO-ENTMCNC: 33.6 G/DL (ref 31–37)
MCV RBC AUTO: 87.7 FL
MONOCYTES # BLD AUTO: 0.4 X10(3) UL (ref 0.1–1)
MONOCYTES NFR BLD AUTO: 5.1 %
NEUTROPHILS # BLD AUTO: 5.92 X10 (3) UL (ref 1.5–7.7)
NEUTROPHILS # BLD AUTO: 5.92 X10(3) UL (ref 1.5–7.7)
NEUTROPHILS NFR BLD AUTO: 76 %
OSMOLALITY SERPL CALC.SUM OF ELEC: 284 MOSM/KG (ref 275–295)
PLATELET # BLD AUTO: 258 10(3)UL (ref 150–450)
POTASSIUM SERPL-SCNC: 3.7 MMOL/L (ref 3.5–5.1)
PROT SERPL-MCNC: 6.8 G/DL (ref 6.4–8.2)
RBC # BLD AUTO: 4.86 X10(6)UL
RH BLOOD TYPE: NEGATIVE
RUBV IGG SER QL: POSITIVE
RUBV IGG SER-ACNC: 109.7 IU/ML (ref 10–?)
SODIUM SERPL-SCNC: 138 MMOL/L (ref 136–145)
TSI SER-ACNC: 0.35 MIU/ML (ref 0.36–3.74)
WBC # BLD AUTO: 7.8 X10(3) UL (ref 4–11)

## 2022-10-10 PROCEDURE — 82728 ASSAY OF FERRITIN: CPT | Performed by: ADVANCED PRACTICE MIDWIFE

## 2022-10-10 PROCEDURE — 87086 URINE CULTURE/COLONY COUNT: CPT | Performed by: ADVANCED PRACTICE MIDWIFE

## 2022-10-10 PROCEDURE — 85025 COMPLETE CBC W/AUTO DIFF WBC: CPT | Performed by: ADVANCED PRACTICE MIDWIFE

## 2022-10-10 PROCEDURE — 86850 RBC ANTIBODY SCREEN: CPT

## 2022-10-10 PROCEDURE — 86803 HEPATITIS C AB TEST: CPT | Performed by: ADVANCED PRACTICE MIDWIFE

## 2022-10-10 PROCEDURE — 86762 RUBELLA ANTIBODY: CPT | Performed by: ADVANCED PRACTICE MIDWIFE

## 2022-10-10 PROCEDURE — 87389 HIV-1 AG W/HIV-1&-2 AB AG IA: CPT | Performed by: ADVANCED PRACTICE MIDWIFE

## 2022-10-10 PROCEDURE — 80053 COMPREHEN METABOLIC PANEL: CPT | Performed by: ADVANCED PRACTICE MIDWIFE

## 2022-10-10 PROCEDURE — 86900 BLOOD TYPING SEROLOGIC ABO: CPT | Performed by: ADVANCED PRACTICE MIDWIFE

## 2022-10-10 PROCEDURE — 84443 ASSAY THYROID STIM HORMONE: CPT | Performed by: ADVANCED PRACTICE MIDWIFE

## 2022-10-10 PROCEDURE — 36415 COLL VENOUS BLD VENIPUNCTURE: CPT | Performed by: ADVANCED PRACTICE MIDWIFE

## 2022-10-10 PROCEDURE — 87340 HEPATITIS B SURFACE AG IA: CPT | Performed by: ADVANCED PRACTICE MIDWIFE

## 2022-10-10 PROCEDURE — 86901 BLOOD TYPING SEROLOGIC RH(D): CPT | Performed by: ADVANCED PRACTICE MIDWIFE

## 2022-10-10 PROCEDURE — 86780 TREPONEMA PALLIDUM: CPT | Performed by: ADVANCED PRACTICE MIDWIFE

## 2022-10-12 LAB — T PALLIDUM AB SER QL: NEGATIVE

## 2022-10-14 ENCOUNTER — TELEPHONE (OUTPATIENT)
Dept: OBGYN CLINIC | Facility: CLINIC | Age: 32
End: 2022-10-14

## 2022-10-14 PROBLEM — O26.899 RH NEGATIVE STATE IN ANTEPARTUM PERIOD: Status: ACTIVE | Noted: 2022-10-14

## 2022-10-14 PROBLEM — Z67.91 RH NEGATIVE STATE IN ANTEPARTUM PERIOD: Status: ACTIVE | Noted: 2022-10-14

## 2022-10-14 NOTE — TELEPHONE ENCOUNTER
----- Message from Keri Saint, CNM sent at 10/14/2022  8:36 AM CDT -----  Please call patient. TSH was slightly low. She should follow-up with whoever manages her hypothyroidism. Rh negative. Remaining labs normal. HIV negative. Still needs to complete 24-hr urine. Thanks!

## 2022-10-14 NOTE — TELEPHONE ENCOUNTER
Notified pt of results & instructions per Pili Belle. Pt has f/u labs to complete for the provider that is managing her thyroid. Will complete her 24hr urine on Tuesday.  Pt verbalized an understanding & agrees w/ plan

## 2022-10-19 ENCOUNTER — LAB ENCOUNTER (OUTPATIENT)
Dept: LAB | Facility: HOSPITAL | Age: 32
End: 2022-10-19
Attending: ADVANCED PRACTICE MIDWIFE
Payer: COMMERCIAL

## 2022-10-19 LAB
M PROTEIN 24H UR ELPH-MRATE: 174.6 MG/24 HR (ref ?–149.1)
SPECIMEN VOL UR: 1800 ML

## 2022-10-19 PROCEDURE — 84156 ASSAY OF PROTEIN URINE: CPT | Performed by: ADVANCED PRACTICE MIDWIFE

## 2022-10-24 ENCOUNTER — ROUTINE PRENATAL (OUTPATIENT)
Dept: OBGYN CLINIC | Facility: CLINIC | Age: 32
End: 2022-10-24
Payer: COMMERCIAL

## 2022-10-24 VITALS — BODY MASS INDEX: 25 KG/M2 | SYSTOLIC BLOOD PRESSURE: 109 MMHG | DIASTOLIC BLOOD PRESSURE: 70 MMHG | WEIGHT: 142 LBS

## 2022-10-24 DIAGNOSIS — Z34.02 ENCOUNTER FOR SUPERVISION OF NORMAL FIRST PREGNANCY IN SECOND TRIMESTER: Primary | ICD-10-CM

## 2022-10-24 PROCEDURE — 3078F DIAST BP <80 MM HG: CPT | Performed by: ADVANCED PRACTICE MIDWIFE

## 2022-10-24 PROCEDURE — 3074F SYST BP LT 130 MM HG: CPT | Performed by: ADVANCED PRACTICE MIDWIFE

## 2022-10-24 RX ORDER — THYROID,PORK 90 MG
100 TABLET ORAL DAILY
COMMUNITY
Start: 2022-10-10

## 2022-10-24 NOTE — PROGRESS NOTES
Chitra Kristin has had some ongoing nausea. Denies vomiting. States it is manageable. Discussed smaller, more frequent meals. Nausea could also be due to acid/heartburn. Suggested trying Tums if occurring after meals. Denies vaginal bleeding or cramping. Has anatomy scan scheduled. Reviewed warning signs and when to call.  TEODORA 4wks

## 2022-11-17 ENCOUNTER — HOSPITAL ENCOUNTER (OUTPATIENT)
Dept: PERINATAL CARE | Facility: HOSPITAL | Age: 32
Discharge: HOME | End: 2022-11-17
Attending: OBSTETRICS & GYNECOLOGY
Payer: COMMERCIAL

## 2022-11-17 ENCOUNTER — ROUTINE PRENATAL (OUTPATIENT)
Dept: OBGYN CLINIC | Facility: CLINIC | Age: 32
End: 2022-11-17
Payer: COMMERCIAL

## 2022-11-17 VITALS
BODY MASS INDEX: 26 KG/M2 | DIASTOLIC BLOOD PRESSURE: 69 MMHG | SYSTOLIC BLOOD PRESSURE: 120 MMHG | WEIGHT: 146 LBS | HEART RATE: 71 BPM

## 2022-11-17 VITALS
SYSTOLIC BLOOD PRESSURE: 126 MMHG | BODY MASS INDEX: 26 KG/M2 | DIASTOLIC BLOOD PRESSURE: 64 MMHG | WEIGHT: 145 LBS | HEART RATE: 60 BPM

## 2022-11-17 DIAGNOSIS — Z34.02 ENCOUNTER FOR SUPERVISION OF NORMAL FIRST PREGNANCY IN SECOND TRIMESTER: Primary | ICD-10-CM

## 2022-11-17 DIAGNOSIS — Z36.3 SCREENING, ANTENATAL, FOR MALFORMATION BY ULTRASOUND: ICD-10-CM

## 2022-11-17 DIAGNOSIS — O09.612 HIGH-RISK PREGNANCY, YOUNG PRIMIGRAVIDA IN SECOND TRIMESTER: ICD-10-CM

## 2022-11-17 DIAGNOSIS — E03.9 HYPOTHYROIDISM, UNSPECIFIED TYPE: ICD-10-CM

## 2022-11-17 DIAGNOSIS — U07.1 COVID-19 AFFECTING PREGNANCY IN FIRST TRIMESTER: Primary | ICD-10-CM

## 2022-11-17 DIAGNOSIS — O98.511 COVID-19 AFFECTING PREGNANCY IN FIRST TRIMESTER: Primary | ICD-10-CM

## 2022-11-17 DIAGNOSIS — U07.1 COVID-19 AFFECTING PREGNANCY IN FIRST TRIMESTER: ICD-10-CM

## 2022-11-17 DIAGNOSIS — O98.511 COVID-19 AFFECTING PREGNANCY IN FIRST TRIMESTER: ICD-10-CM

## 2022-11-17 PROCEDURE — 3074F SYST BP LT 130 MM HG: CPT | Performed by: ADVANCED PRACTICE MIDWIFE

## 2022-11-17 PROCEDURE — 3078F DIAST BP <80 MM HG: CPT | Performed by: ADVANCED PRACTICE MIDWIFE

## 2022-11-17 PROCEDURE — 76811 OB US DETAILED SNGL FETUS: CPT | Performed by: OBSTETRICS & GYNECOLOGY

## 2022-11-17 NOTE — PROGRESS NOTES
Feeling well. Has some lingering nausea occasionally but it is much improved. Believes she may be starting to feel fetal movement. Denies LOF, vaginal bleeding, cramping. Has anatomy scan today. Discussed childbirth education classes. Reviewed warning signs and when to call.  TEODORA 4wks

## 2022-11-17 NOTE — PATIENT INSTRUCTIONS
Childbirth Education Resources    HYPNOBIRTHING  [facility] Bab[facility] www.blissfulbirthsandbabies. [employer]ocasts [phone]  Vasyl, [place] END, [address], [facility], [facility], [place], [facility] FloydFara. Fl[employer]asts  [phone]    [address] [facility]/The Birth Zone www. thebirthzone. [employer]  [phone]    EVIDENCE BASED BIRTH  [facility]   www.sim4tecbirth.Flocasts         Cheral Brought  www.birthIntrakrchristine. [employer]  [phone]  [place] www. [employer]  [phone]      [facility]  www.Wantworthy.Flocasts          Paul Guo  www.Wanjee Operation and Maintenancebirth.Flocasts/caroljerman [phone]  [address] [place] [address]  www.Wan[employer] Operation and Maintenancebirth.Flocasts/nehemiasepotts [phone]  Lynsagar Moy  www.Wan[employer] Operation and Maintenancebirth.Flocasts/marissalukas [phone]  D[employer]R  VYCH Bicske  www.[employer]communication.Flocasts/lisaupham [phone]  Sueanne Redo  www.birthwithoutfear. [employer]  [phone]  [address] [place] [address] Dr Liang Aguilar. [facility]   [phone]  [place] INFORMED BEGINNINGS  [facility]  www.BirdpostAnam Mobile   Dorisann Curet Birth www.[facility].Flocasts  [phone]  Char Aw classes  throughout [facility] area     [address] [facility] www.[employer]  [phone]  Elvia Paez  The [address]   https://thepositivebirthcompany. co.uk/digital-pack  Mama Natural  www.mamanaturalbirth.com     OTHERS  Teen Parent Connection www.teenparentconnection. org     Free classes for 23yo & under  Imra Leyva   www.[employer]  [phone]  [address] [facility]  www. PBworks/classes    [facility]'s   www.[facility].org/services/pregnancy-baby/resources     Childbirth Express    Pregnancy Birth and Beyond www. [employer]. [employer]  ([address] to [facility] Childbirth www.[employer]  ([address] and BREASTFEEDING  A Baby Place (Breastfeeding) www.[employer]izer  [phone]  GEORGIA Mejía    Breastfeeding  www. [employer]ach[employer]  [phone]    with Valora Fees    Bringing home baby  www. [employer]  [phone]    with [facility]  www.[facility].org/services/pregnancy-baby/resources     Breastfeeding class    Virtual Babycare Class  www.[facility].org/services/pregnancy-baby/resources  For [facility] AND [facility]

## 2023-01-04 NOTE — TELEPHONE ENCOUNTER
All questions answered. Instructions for testing reviewed. Pt prefers to complete labs at [facility] lab. Orders changed.  Pt verbalized an understanding & agrees w/ plan

## 2023-01-06 NOTE — PROGRESS NOTES
Active baby. Third trimester labs discussed. Reviewed danger signs, childbirth ed options, fetal movement awareness.
EKG ordered

## 2023-01-06 NOTE — TELEPHONE ENCOUNTER
----- Message from Sierra Saul CNM sent at 1/6/2023  7:20 AM CST -----  Normal 3rd trimester labs including HIV.  Please call

## 2023-01-10 NOTE — PROGRESS NOTES
Questions about CNM care answered. Placenta will need to go to [facility] for covid in pregnancy. She declines growth ultrasound at this time unless there is further concerns like lagging FH, etc. [facility] Td[facility] at [facility]. Her and  are RN's at [facility]. Kick counts, pre-e signs, PTL signs reviewed.

## 2023-01-10 NOTE — PROGRESS NOTES
Rhogam injection given as ordered. Pt tolerated injection well. Rhogam information sheet provided. ABO RH and antibody screen not done with 28 week labs. Per MJ, pt to receive Rhogam now and can go to lab after to complete. Pt agreed and verbalized understanding.

## 2023-01-27 NOTE — PROGRESS NOTES
Chinmay Box presents with her partner for OB visit. Feeling well. Endorses regular fetal movement. Denies LOF, vaginal bleeding, contractions. They are requesting clarification on placenta release policy as they desires to take placenta home and Chinmay Box had covid in the first trimester. They report they would like it for cultural reasons. Did not get full details but they mentioned burying placenta. Partner's mom and grandma also kept placenta. Informed them that placenta policy goes above me and our office and that any decisions regarding policy would have to be made at higher department level. Will assist patient and partner with initiating this process. Reviewed warning signs and when to call.  TEODORA 2wks

## 2023-02-01 NOTE — TELEPHONE ENCOUNTER
Gloria Almeida,     Please sign off on form: la maternity leave  -Highlight the patient and hit \"Chart\" button. -In Chart Review, w/in the Encounter tab - click 1 time on the Telephone call encounter for 1/27/23 Scroll down the telephone encounter.  -Click \"scan on\" blue Hyperlink under \"Media\" heading for [place] CARLOS Monaco Hema 2/1/23 w/in the telephone enc.  -Click on Acknowledge button at the bottom right corner and left-click onto image, signature stamp appears and drag signature to Provider signature line. Stamp will turn blue. Close window. Thank you,    Herminio Hughes.

## 2023-02-06 NOTE — PROGRESS NOTES
Feeling well. Endorses regular fetal movement. Denies contractions, LOF, vaginal bleeding. Discussed placenta protocol. Patient desires to take home even though she had covid. They would like to bury the placenta, as his mother, grandmother and sister have done and as they did with their miscarriage. Will contact Beverly Puente and Dr. Annita Silvestre with this information. Reviewed warning signs and when to call.  TEODORA 2 wks

## 2023-02-21 NOTE — PROGRESS NOTES
Pt is a 29year old female admitted to TR1/TR1-A. Patient presents with:  R/o Pih: Sent from office for lab work up and serial BP, had elevated BP in office x2     Pt is  34w2d intra-uterine pregnancy. History obtained, consents signed. Oriented to room, staff, and plan of care.

## 2023-02-21 NOTE — DISCHARGE INSTRUCTIONS
Take BP once a day and enter into [employer]. Kevin Gomez,  will send you an invite for a BP log in [employer]. Call provider if experiencing Pre-eclampsia symptoms below.

## 2023-02-21 NOTE — TRIAGE
[facility] HOSP - [facility]      Triage Note    Barbara Guevara Patient Status:  Outpatient    1990 MRN [id number]   Location [address] Attending Alivia Snyder, [address] Day # 0 PCP No primary care provider on file.  Para: [id number]  Estimated Date of Delivery: 23  Gestation: 34w2d    Chief Complaint    R/o Pih         Allergies:  No Known Allergies    Orders Placed This Encounter      Comp Metabolic Panel (14)      CBC With Differential With Platelet      Protein/Creatinine Ratio, Urine Random      Lab Results   Component Value Date    WBC 7.4 2023    HGB 15.8 2023    HCT 45.3 2023    .0 2023    CREATSERUM 0.68 2023    BUN 7 2023     2023    K 3.8 2023     2023    CO2 25.0 2023    GLU 95 2023    CA 9.6 2023    ALB 3.0 (L) 2023    ALKPHO 150 (H) 2023    BILT 0.5 2023    TP 7.6 2023    AST 22 2023    ALT 32 2023    TSH 0.350 (L) 10/10/2022       Clinitek UA  Lab Results   Component Value Date    SPECGRAVITY 1.030 2023    URINECUL  10/10/2022     <10,000 cfu/ml Mixture of Gram positive organisms isolated - probable contamination.        UA  Lab Results   Component Value Date    SPECGRAVITY 1.030 2023    NITRITE Negative 2023  1545 23  1600 23  1615 23  1630   BP: 140/87 130/84 123/89 119/88   Pulse: 72 80 84 65       NST  Variability: Moderate           Accelerations: Yes           Decelerations: None            Baseline: 140 BPM           Uterine Irritability: No           Contractions: Not present                                        Acoustic Stimulator: No           Nonstress Test Interpretation: Reactive           Nonstress Test Second Interpretation: Reactive          FHR Category: Category I             Additional Comments       Patient presents with:  R/o Pih: Sent from office for lab work up and serial BP, had elevated BP in office x2    , 34w2d, CBC, CMP, PC ratio done and WNL. BP readings reviewed with Tien Alvarenga. Advised patient to measure BP once a day and report result into [facility] log that Dionne Khan will send. Reviewed pre-eclampsia signs and advised her to call provider if experiencing symptoms. Also, advised pt to schedule an appointment in office next week for follow up. Patient verbalized understanding for all info given, all questions answered. Betty Yeung RN  2023 4:58 PM    CNM Addendum  NST reviewed and reactive. Reviewed labs and blood pressures. Agree with plan above.    Nancy Khan CNM

## 2023-02-21 NOTE — PROGRESS NOTES
Tobias Ortiz is a 29year old , at 35w2d, here for her return OB visit. Currently, she is feeling well. Denies contractions, bleeding, leakage of fluid, headache, vision changes and right upper quadrant pain. Endorses active fetus. Reports her and her  were rushing to get here and walked far from the car so she thinks that may be why her blood pressure is elevated today. Vital signs and weight reviewed  See flowsheets     Today's Assessment/Plan:   1. Discussed GBBS swab for next visit. 2. Pt has next TSH lab next week  3. Discussed going to [facility] for pre-eclampsia evaluation. CNM on call and OB Triage RN notified. Next visit: To [facility] for pre-eclampsia evaluation    Reviewed:   3rd trimester precautions and expectations   labor precautions  Kick counts  Danger signs  Prenatal visit schedule    Pt verbalized understanding. All questions answered.  No barriers to learning identified

## 2023-02-22 NOTE — TELEPHONE ENCOUNTER
Pt was in [facility] yesterday after her PN appt for elevated BP's. BP's have been 120's 70's & pt is asymptomatic. 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 Asking if it is ok if she goes if everything is normal can she still go on her trip. Discussed normal BP's, symptoms of PE & lability of BP's. Pt will continue to take BP's at home & notify w/ any abnormal results.  Pt verbalized an understanding & agrees w/ plan

## 2023-02-28 NOTE — PROGRESS NOTES
[facility] presents for follow-up to elevated blood pressure last week. She has been checking BP at home and has readings that range 110-126/74-86. Today BP is normal. Denies HA, vision changes, epigastric pain. She will continue checking BP at home. Endorses regular fetal movement. Denies LOF, vaginal bleeding, contractions. She states they have not heard back regarding placenta. I informed them I would send another email and if they do not hear anything by Friday, they should reach out to our office. Reviewed warning signs and when to call.  TEODORA 1wk

## 2023-03-01 NOTE — TELEPHONE ENCOUNTER
Received phone call from patient. Reviewed that we are able to release her placenta providing it does not have to go to pathology (reasons including but not limited to , infection). Pt is aware and agrees to this. She can call anytime with further questions.

## 2023-03-08 NOTE — PROGRESS NOTES
Ivory Dallas, is at 36w3d, here for her [facility] visit. Currently, she is feeling well. Denies 3rd trimester danger signs. Denies HA, RUQ pain, vision changes. Taking BP at least once per day and reports <125 over <90 at home. Admits she is not always righting them down but have all been WNL. Vital signs and weight reviewed  See flowsheets    Assessment/Plan: GBS collected  Hypothyroind: Today's TSH reviewed with pt and she will connect with her endocrinology provider for management  Elevated BP w/o diagnosis: normal BP's since. Continue to monitor  Review birth plan next visit   Next visit: 1 week     Reviewed:   Prenatal visit schedule  3rd trimester precautions and expectations  Kick counts  Danger signs  Labor precautions  Current L&D policies: Three visitors plus jannet  COVID-19 screening  Nitrous and waterbirth available    Pt verbalized understanding. All questions answered. No barriers to learning identified  LENNIE Ahn/EDER under direct supervision of Milady Lanier CNM    Patient seen in conjunction with LENNIE. I personally witnessed the patient's exam and medical decision making on this date of service. I was physically present in the room for the performance of the E/M service. I have reviewed the CYNTHIA student's documentation and findings including history, Exam, and Medical Decision Making, edited the document for accuracy and verify that it represents the clinical findings and services performed.

## 2023-03-08 NOTE — PROGRESS NOTES
Patient seen in conjunction with [facility]. I personally witnessed the patient's exam and medical decision making on this date of service. I was physically present in the room for the performance of the E/M service. I have reviewed the CNM student's documentation and findings including history, Exam, and Medical Decision Making, edited the document for accuracy and verify that it represents the clinical findings and services performed.

## 2023-03-14 NOTE — PROGRESS NOTES
On Sunday B/P's creeping up- 121/89  Yesterday 132/90, 134/90, 134/99  Before bed 126/90  Today 137//96, 140/100  Has a mild headache, Started on Sunday, went away but feels slightly in right orbital/temporal area. Feels like getting more short of breath. No chest pain. Baby active. No contractions, LOF or bleeding. Lungs CTA, normal heart sounds. Cervix 1 cm. Discussed with elevated b/p's now and over last few days as well as elevated b/p's at 34 wks meets criteria for GHTN. Need serial b/ps and labs in triage for r/o pre-e with PC ratio. Discussed recommendation for delivery at term for [facility]. Discussed cervical ripening measures. Was hoping to avoid IOL but seems open as we reviewed risks of continuing pregnancy with risk to progress to more severe disease and the complications. To triage for b/p's and labs. Triage Rn and on call CNM notified.

## 2023-03-14 NOTE — TELEPHONE ENCOUNTER
Pt reports on Sunday had elevated BP w/ slight HA(1of10 on pain scale). 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 Yesterday pt was running around & had BP's that were up & down including sl HA, again. Today, pt states she still has the same symptoms & \"feels off. \" /100. Instructed pt to come to office for eval. appt scheduled.  Pt verbalized an understanding & agrees w/ plan

## 2023-03-14 NOTE — PROGRESS NOTES
Pt is a 29year old female admitted to TR3/TR3-A. Patient presents with:  Elevated BP: Sent in after prenatal office visit     Pt is  37w2d intra-uterine pregnancy. History obtained, consents signed. Oriented to room, staff, and plan of care.

## 2023-03-15 NOTE — PROGRESS NOTES
Pt is a 29year old female admitted to 373/373-A. Patient presents with:  Elevated BP: Sent in after prenatal office visit     Pt is  37w2d intra-uterine pregnancy. History obtained, consents signed. Oriented to room, staff, and plan of care.

## 2023-03-16 NOTE — ANESTHESIA POSTPROCEDURE EVALUATION
Patient: Ana Bolton    Procedure Summary     Date: 03/15/23 Room / Location:     Anesthesia Start: 2350 Anesthesia Stop: 03/16/23 0533    Procedure: LABOR ANALGESIA Diagnosis:     Scheduled Providers:  Anesthesiologist: Belle Rivero MD    Anesthesia Type: epidural ASA Status: 2          Anesthesia Type: epidural    Vitals Value Taken Time   /87 03/16/23 0607        Pulse 92 03/16/23 0607        SpO2 96 % 03/16/23 0605   Vitals shown include unvalidated device data.     [address] [place] [address] AN Post Evaluation:   Patient Participation: complete - patient participated  Level of Consciousness: awake  Pain Management: adequate  Airway Patency:patent  Dental exam unchanged from preop  Yes    Cardiovascular Status: acceptable  Respiratory Status: acceptable  Postoperative Hydration acceptable      Ying Lopez MD  3/16/2023 6:09 AM

## 2023-03-16 NOTE — PROGRESS NOTES
Patient up to bathroom with assist x 2. Voided 550. Patient transferred to mother/baby room 350 per wheelchair in stable condition with baby and personal belongings. Accompanied by significant other and staff. Report given to mother/baby RN.

## 2023-03-16 NOTE — L&D DELIVERY NOTE
Fela Child, Girl [[id number]]    Labor Events     labor?: No   steroids?: None  Antibiotics received during labor?: No  Antibiotics (enter # doses in comment): none  Rupture date/time: 3/15/2023 2250     Rupture type: SROM  Fluid color: Clear, Pink  Induction: Cervical Ripening Balloon, Misoprostol, Oxytocin  Indications for induction: PIH  Intrapartum & labor complications: Decreased FHR variability, Variable decelerations, PIH     Labor Length    2nd stage: 1h 32m  3rd stage: 0h 11m     Labor Event Times    Dilation complete date/time: 3/16/2023 0350  Start pushing date/time: 3/16/2023 0356      Presentation    Presentation: Vertex  Position: Left Occiput Anterior     Operative Delivery    Operative Vaginal Delivery: No            Shoulder Dystocia    Shoulder Dystocia: No     Anesthesia    Method: Epidural, Local   Analgesics:  Analgesics   FENTANYL (OPIOID AGONISTS)         [place] Delivery    Head delivery date/time: 3/16/2023 05:22:18   Delivery date/time:  3/16/23 05:22:00   Delivery type: Normal spontaneous vaginal delivery    Details:     Delivery location: delivery room  Delivery Room Temperature: 76     Delivery Providers    Delivering Clinician: Low Roche, [address] [facility]   Delivery personnel:  Provider Role   Azeem Denton, RN Baby Nurse   [place], RN Delivery Nurse   James Childress, BRIDGETT Charge Nurse   Denis Montanez MD Neonatologist         Cord    Vessels: 3 Vessels  Complications: None  Timed cord clamping: Yes  Time in sec: [address]  Cord blood disposition: to lab  Gases sent?: Yes     Resuscitation    Method: Suctioning, Oxygen     [place] Measurements    Weight: 2890 g 6 lb 5.9 oz Length: 52.1 cm   Head circum.: 32 cm          Placenta    Date/time: 3/16/2023 0533  Removal: Spontaneous  Appearance: Intact  Disposition: Family     Apgars    Living status: Living   Apgar Scoring Key:    0 1 2    Skin color Blue or pale Acrocyanotic Completely pink    Heart rate Absent <100 bpm >100 bpm    Reflex irritability No response Grimace Cry or active withdrawal    Muscle tone Limp Some flexion Active motion    Respiratory effort Absent Weak cry; hypoventilation Good, crying              1 Minute:  5 Minute:  10 Minute:  15 Minute:  20 Minute:    Skin color: 0  1       Heart rate: 2  2       Reflex irritablity: 2  2       Muscle tone: 2  2       Respiratory effort: 1  2       Total: 7  9          Apgars assigned by: Tr Evans RN AND DR TAI   disposition: with mother     Skin to Skin    Skin to skin initiated date/time: 3/16/2023 0538  Skin to skin with: Mother     Vaginal Count    Initial count RN: Aide Khoury RN  Initial count Tech: Сергей Shackle   Sponges   Sharps    Initial counts 10   0    Final counts 10   2    Final count RN: Aide Khoury RN  Final count MD: Krishna Manjarrez CNM     Delivery (Maternal)    Episiotomy: None  Perineal lacerations: 1st Repaired?: Yes   Vaginal laceration?: No    Cervical laceration?: No    Clitoral laceration?: No    Quantitative blood loss (mL): 230          Patient pushed in multiple positions to vaginal delivery. Baby with initial cry but poor color. Cord cut and clamped by FOB and baby to warmer at 1 min. FHR >140, See RN and MD documentation. Apgars 7,9. Cord gasses obtained. Infant stable and returned to mom by the time CNM left the room. Small 1st degree perineal repaired. Placenta intact, QBL as noted. Om and baby stable.

## 2023-03-16 NOTE — PROGRESS NOTES
Received patient into room 350. Bedside shift report received from Abundio Cotter. Bed in locked and low position. Side rails up x2. Vital signs and assessment WNL. Patient and family oriented to unit, room and call light within reach. Reinforced to patient to call for assistance. Plan of care reviewed.

## 2023-03-16 NOTE — ANESTHESIA PROCEDURE NOTES
Labor Analgesia    Date/Time: 3/16/2023 10:58 PM    Performed by: Sai Machado MD  Authorized by: Sai Machado MD      General Information and Staff    Start Time:  3/16/2023 10:58 PM  End Time:  3/16/2023 10:10 PM  Anesthesiologist:  Sai Machado MD  Performed by:   Anesthesiologist  Patient Location:  OB  Reason for Block: labor epidural    Preanesthetic Checklist: patient identified, IV checked, site marked, risks and benefits discussed, monitors and equipment checked, pre-op evaluation, timeout performed, anesthesia consent and sterile technique used      Procedure Details    Patient Position:  Sitting  Prep: ChloraPrep    Monitoring:  Heart rate  Approach:  Midline    Epidural Needle    Injection Technique:  RAYMOND air  Needle Type:  Tuohy  Needle Gauge:  18 G  Needle Length:  3.5 in  Needle Insertion Depth:  5  Location:  L2-3    Spinal Needle      Catheter    Catheter Type:  Multi-orifice  Catheter Size:  20 G  Catheter at Skin Depth:  11  Test Dose:  Negative    Assessment      Additional Comments

## 2023-03-17 NOTE — PLAN OF CARE
Problem: ANXIETY  Goal: Will report anxiety at manageable levels  Description: INTERVENTIONS:  - Administer medication as ordered  - Teach and rehearse alternative coping skills  - Provide emotional support with 1:1 interaction with staff  3/17/2023 0918 by Marleny Spencer RN  Outcome: Progressing  3/17/2023 Leah Martinez 47 by Marleny Spencer RN  Outcome: Progressing     Problem: Patient Centered Care  Goal: Patient preferences are identified and integrated in the patient's plan of care  Description: Interventions:  - What would you like us to know as we care for you?   - Provide timely, complete, and accurate information to patient/family  - Incorporate patient and family knowledge, values, beliefs, and cultural backgrounds into the planning and delivery of care  - Encourage patient/family to participate in care and decision-making at the level they choose  - Honor patient and family perspectives and choices  3/17/2023 0918 by Marleny Spencer RN  Outcome: Progressing  3/17/2023 0917 by Marleny Spencer RN  Outcome: Progressing     Problem: Patient/Family Goals  Goal: Patient/Family Long Term Goal  Description: Patient's Long Term Goal:     Interventions:  -   - See additional Care Plan goals for specific interventions  3/17/2023 0918 by Marleny Spencer RN  Outcome: Progressing  3/17/2023 0917 by Marleny Spencer RN  Outcome: Progressing  Goal: Patient/Family Short Term Goal  Description: Patient's Short Term Goal:     Interventions:   -   - See additional Care Plan goals for specific interventions  3/17/2023 0918 by Marleny Spencer RN  Outcome: Progressing  3/17/2023 0917 by Marleny Spencer RN  Outcome: Progressing     Problem: POSTPARTUM  Goal: Long Term Goal:Experiences normal postpartum course  Description: INTERVENTIONS:  - Assess and monitor vital signs and lab values. - Assess fundus and lochia. - Provide ice/sitz baths for perineum discomfort.   - Monitor healing of incision/episiotomy/laceration, and assess for signs and symptoms of infection and hematoma. - Assess bladder function and monitor for bladder distention.  - Provide/instruct/assist with pericare as needed. - Provide VTE prophylaxis as needed. - Monitor bowel function.  - Encourage ambulation and provide assistance as needed. - Assess and monitor emotional status and provide social service/psych resources as needed. - Utilize standard precautions and use personal protective equipment as indicated. Ensure aseptic care of all intravenous lines and invasive tubes/drains.  - Obtain immunization and exposure to communicable diseases history. Outcome: Progressing  Goal: Optimize infant feeding at the breast  Description: INTERVENTIONS:  - Initiate breast feeding within first hour after birth. - Monitor effectiveness of current breast feeding efforts. - Assess support systems available to mother/family.  - Identify cultural beliefs/practices regarding lactation, letdown techniques, maternal food preferences. - Assess mother's knowledge and previous experience with breast feeding.  - Provide information as needed about early infant feeding cues (e.g., rooting, lip smacking, sucking fingers/hand) versus late cue of crying.  - Discuss/demonstrate breast feeding aids (e.g., infant sling, nursing footstool/pillows, and breast pumps). - Encourage mother/other family members to express feelings/concerns, and actively listen. - Educate father/SO about benefits of breast feeding and how to manage common lactation challenges. - Recommend avoidance of specific medications or substances incompatible with breast feeding.  - Assess and monitor for signs of nipple pain/trauma. - Instruct and provide assistance with proper latch. - Review techniques for milk expression (breast pumping) and storage of breast milk. Provide pumping equipment/supplies, instructions and assistance, as needed.   - Encourage rooming-in and breast feeding on demand.  - Encourage skin-to-skin contact. - Provide LC support as needed. - Assess for and manage engorgement. - Provide breast feeding education handouts and information on community breast feeding support. Outcome: Progressing  Goal: Establishment of adequate milk supply with medication/procedure interruptions  Description: INTERVENTIONS:  - Review techniques for milk expression (breast pumping). - Provide pumping equipment/supplies, instructions, and assistance until it is safe to breastfeed infant. Outcome: Progressing  Goal: Experiences normal breast weaning course  Description: INTERVENTIONS:  - Assess for and manage engorgement. - Instruct on breast care. - Provide comfort measures. Outcome: Progressing  Goal: Appropriate maternal -  bonding  Description: INTERVENTIONS:  - Assess caregiver- interactions. - Assess caregiver's emotional status and coping mechanisms. - Encourage caregiver to participate in  daily care. - Assess support systems available to mother/family.  - Provide /case management support as needed.   Outcome: Progressing

## 2023-03-17 NOTE — LACTATION NOTE
LACTATION NOTE - MOTHER      Evaluation Type: Inpatient    Problems identified  Problems identified: Knowledge deficit; Nipple pain  Milk supply not WNL: Reduced (potential)  Problems Identified Other: Gestational HTN, infant with suspected upper lip tie. Maternal history  Maternal history: Hypothyroid    Breastfeeding goal  Breastfeeding goal: To maintain breast milk feeding per patient goal    Maternal Assessment  Bilateral Breasts: Dense;Symmetrical  Bilateral Nipples: Colostrum easily expressed;Sore;WNL  Prior breastfeeding experience (comment below): Primip  Breastfeeding Assistance: Breastfeeding assistance provided with permission    Pain assessment  Pain, additional: Pain location  Pain Location: Nipples  Location/Comment: unable to tolerate infant latch, nipple shield applied with improvement  Treatment of Sore Nipples: Expressed breast milk;Deeper latch techniques; Lanolin    Guidelines for use of:  Equipment: Nipple shield  Current use of pump[de-identified] RN to initiate  Suggested use of pump: Pump after nursing if a nipple shield is used;Pump if infant is not latching to breast;Pump each time a supplement is offered  Other (comment): Mother unable to tolerate reported pain with latch, infant chomping at breast, nipple shield applied with improvement in comfort, milk in NS following feeding, fed from right breast only. MB Rn to initiate pumping.

## 2023-03-17 NOTE — LACTATION NOTE
This note was copied from a baby's chart. LACTATION NOTE - INFANT    Evaluation Type  Evaluation Type: Inpatient    Problems & Assessment  Problems Diagnosed or Identified: Latch difficulty;  feeding problem; Shallow latch;Sleepy  Problems: comment/detail: Maternal nipple pain with latch, ineffective suck pattern identifed at this feeding, suspected upper lip tie  Muscle tone: Appropriate for GA    Feeding Assessment  Summary Current Feeding: Breastfeeding exclusively  Last 24 hour feeding summary: manually expressing, spoon feeding, few painful latches. Had one feeding at midnight, good latch without pain and active suck  Breastfeeding Assessment: Assisted with breastfeeding w/mother's permission  Breastfeeding Positions: football;right breast;left breast  Latch: Repeated attempts, hold nipple in mouth, stimulate to suck  Audible Sucks/Swallows: A few with stimulation  Type of Nipple: Everted (after stimulation)  Comfort (Breast/Nipple): Filling, red/small blisters/bruises, mild/mod discomfort  Hold (Positioning): Full assist, teach one side, mother does other, staff holds  [facility] Score: 6  Other (comment): Mother unable to tolerate reported pain with latch, infant chomping at breast, nipple shield applied with improvement in comfort, milk in NS following feeding, fed from right breast only. MB Rn to initiate pumping.               Equipment used  Equipment used: Nipple Shield  Nipple shield size: 16 mm

## 2023-03-17 NOTE — PROGRESS NOTES
Late entry:  Epidural was placed by Dr Rizwana Erickson and test dose done at [address] on 3/16. Epidural was routinely assessed and noted to be in place and continuously infusing with dressing clean dry and intact through delivery. Epidural was stopped at 0545. Epidural was removed with blue tip intact, site benign at 0730.

## 2023-03-18 NOTE — PLAN OF CARE
Sat with patient to review plan of care. Discussed analgesic options and answered all questions. VSS. Breastfeeding on demand. Breastfeeding successfully. Voiding independently. Lochia is WNL. Uterus is firm. Problem: ANXIETY  Goal: Will report anxiety at manageable levels  Description: INTERVENTIONS:  - Administer medication as ordered  - Teach and rehearse alternative coping skills  - Provide emotional support with 1:1 interaction with staff  Outcome: Completed     Problem: Patient Centered Care  Goal: Patient preferences are identified and integrated in the patient's plan of care  Description: Interventions:  - What would you like us to know as we care for you?   - Provide timely, complete, and accurate information to patient/family  - Incorporate patient and family knowledge, values, beliefs, and cultural backgrounds into the planning and delivery of care  - Encourage patient/family to participate in care and decision-making at the level they choose  - Honor patient and family perspectives and choices  Outcome: Completed     Problem: POSTPARTUM  Goal: Long Term Goal:Experiences normal postpartum course  Description: INTERVENTIONS:  - Assess and monitor vital signs and lab values. - Assess fundus and lochia. - Provide ice/sitz baths for perineum discomfort. - Monitor healing of incision/episiotomy/laceration, and assess for signs and symptoms of infection and hematoma. - Assess bladder function and monitor for bladder distention.  - Provide/instruct/assist with pericare as needed. - Provide VTE prophylaxis as needed. - Monitor bowel function.  - Encourage ambulation and provide assistance as needed. - Assess and monitor emotional status and provide social service/psych resources as needed. - Utilize standard precautions and use personal protective equipment as indicated.  Ensure aseptic care of all intravenous lines and invasive tubes/drains.  - Obtain immunization and exposure to communicable diseases history. Outcome: Completed  Goal: Optimize infant feeding at the breast  Description: INTERVENTIONS:  - Initiate breast feeding within first hour after birth. - Monitor effectiveness of current breast feeding efforts. - Assess support systems available to mother/family.  - Identify cultural beliefs/practices regarding lactation, letdown techniques, maternal food preferences. - Assess mother's knowledge and previous experience with breast feeding.  - Provide information as needed about early infant feeding cues (e.g., rooting, lip smacking, sucking fingers/hand) versus late cue of crying.  - Discuss/demonstrate breast feeding aids (e.g., infant sling, nursing footstool/pillows, and breast pumps). - Encourage mother/other family members to express feelings/concerns, and actively listen. - Educate father/SO about benefits of breast feeding and how to manage common lactation challenges. - Recommend avoidance of specific medications or substances incompatible with breast feeding.  - Assess and monitor for signs of nipple pain/trauma. - Instruct and provide assistance with proper latch. - Review techniques for milk expression (breast pumping) and storage of breast milk. Provide pumping equipment/supplies, instructions and assistance, as needed. - Encourage rooming-in and breast feeding on demand.  - Encourage skin-to-skin contact. - Provide LC support as needed. - Assess for and manage engorgement. - Provide breast feeding education handouts and information on community breast feeding support. Outcome: Completed  Goal: Establishment of adequate milk supply with medication/procedure interruptions  Description: INTERVENTIONS:  - Review techniques for milk expression (breast pumping). - Provide pumping equipment/supplies, instructions, and assistance until it is safe to breastfeed infant.   Outcome: Completed  Goal: Experiences normal breast weaning course  Description: INTERVENTIONS:  - Assess for and manage engorgement. - Instruct on breast care. - Provide comfort measures. Outcome: Completed  Goal: Appropriate maternal -  bonding  Description: INTERVENTIONS:  - Assess caregiver- interactions. - Assess caregiver's emotional status and coping mechanisms. - Encourage caregiver to participate in  daily care. - Assess support systems available to mother/family.  - Provide /case management support as needed.   Outcome: Completed

## 2023-03-18 NOTE — DISCHARGE SUMMARY
[facility]    Discharge Summary    Jerry Maldonado Patient Status:  Inpatient    1990 MRN [id number]   Location [facility] 3SE Attending Trudy Paredes CNM   Hosp Day # 4       Delivering OB Clinician: Meghna Dempsey CNM    [facility]: Estimated Date of Delivery: 23    Gestational Age: 37w4d    Antepartum complications: Patient Active Problem List:     Hypothyroid     Rh negative state in antepartum period     Placenta Release     Elevated BP without diagnosis of hypertension     Gestational hypertension, third trimester     Gestational hypertension     Vaginal delivery      Date of Delivery: 3/16/2023 Time of Delivery: 5:22 AM    Delivery Type: spontaneous vaginal delivery    Baby: Liveborn female Information for the patient's : Ke Bullion, Girl [[id number]]   6 lb 5.9 oz (2.89 kg)  Apgars:  1 minute: 7  5 minutes: 910 minutes:       Intrapartum Complications: PIH    Admit Date: 3/14/2023    Discharge Date: 3/18/2023    Hospital Course: started on labetelol on PPD1 for gestational hypertension.  Routine delivery and postpartum care    Discharged Condition: stable    Disposition: home    Plan:     Follow-up appointment in 3 days for BP check, 2 weeks and 6 weeks with Meghna Dempsey, [address] [place], [place] [address]CYNTHIA  3/18/2023  3:17 PM

## 2023-03-20 NOTE — PROGRESS NOTES
Pt seen today for BP check. Pt is 4 days PP. Pt's BP today in office is 112/74. Pt denies HA, vision changes, or RUQ pain. Pearl Koenig notified. Pt advised per Pearl Koenig she is to f/u in office for 2wk PP appt in person and continue with Nifedipine 30 mg. Pt agreed and voiced understanding. Pt reporting foul smell of lochia, KEIKO notified and went into room to discuss with pt.

## 2023-03-20 NOTE — TELEPHONE ENCOUNTER
Pt states she has peds appt today for baby and would like to come in today for BP check. Appt scheduled today. Pt agreed and voiced understanding.

## 2023-03-20 NOTE — TELEPHONE ENCOUNTER
Pt was told to schedule blood pressure check with nurse. Wanted to discuss when that should be done. Please call.

## 2023-03-21 NOTE — TELEPHONE ENCOUNTER
6 days PP- noticed one large clots starting last night- lemon sized clot. No pain, no fever, bleeding is decreasing. Please call to advise.

## 2023-03-21 NOTE — TELEPHONE ENCOUNTER
Reports passing lemon sized clot last noc. Denies any cramping. Bleeding has actually decreased. Pt had appt yesterday & was doing more walking. Instructed pt to monitor & call w/ any further clots or heaving bleeding.  Pt verbalized an understanding & agrees w/ plan

## 2023-03-22 NOTE — PAYOR COMM NOTE
--------------  DISCHARGE REVIEW    Payor: [employer] [address] #:  [id number]  Authorization Number: [id number]    Admit date: 3/14/23  Admit time:   6:13 PM  Discharge Date: 3/18/2023  6:23 PM     Admitting Physician: Lorraine Ahmadi MD  Attending Physician:  No att. providers found  Primary Care Physician: No primary care provider on file. Discharge Summary Notes      Discharge Summary signed by Juwan Moore CNM at 3/18/2023  3:19 PM     Author: Juwan Moore CNM Specialty: Midwife, OBSTETRICS & GYNECOLOGY Author Type: Midwife    Filed: 3/18/2023  3:19 PM Date of Service: 3/18/2023  3:16 PM Status: Signed    : Juwan Moore CNM (Midwife)         [facility]    Discharge Summary    Johan Mcpherson Patient Status:  Inpatient    1990 MRN [id number]   Location [facility] 3SE Attending Joao Eason, CYNTHIA   Hosp Day # 4       Delivering OB Clinician: Chelsea Hsieh CNM    [facility]: Estimated Date of Delivery: 23    Gestational Age: 37w4d    Antepartum complications: Patient Active Problem List:     Hypothyroid     Rh negative state in antepartum period     Placenta Release     Elevated BP without diagnosis of hypertension     Gestational hypertension, third trimester     Gestational hypertension     Vaginal delivery      Date of Delivery: 3/16/2023 Time of Delivery: 5:22 AM    Delivery Type: spontaneous vaginal delivery    Baby: Liveborn female Information for the patient's : Linda Pro, Girl [[id number]]   6 lb 5.9 oz (2.89 kg)  Apgars:  1 minute: 7  5 minutes: 910 minutes:       Intrapartum Complications: PIH    Admit Date: 3/14/2023    Discharge Date: 3/18/2023    Hospital Course: started on labetelol on PPD1 for gestational hypertension.  Routine delivery and postpartum care    Discharged Condition: stable    Disposition: home    Plan:     Follow-up appointment in 3 days for BP check, 2 weeks and 6 weeks with Nola Chan Megan [address]ran [address], [address], [place]  3/18/2023  3:17 PM            Electronically signed by Sandie Lechuga CNM on 3/18/2023  3:19 PM         REVIEWER COMMENTS

## 2023-03-28 NOTE — PROGRESS NOTES
LACTATION NOTE - MOTHER      Evaluation Type: Outpatient Initial    Problems identified  Problems identified: Knowledge deficit;Milk supply WNL; Nipple pain  Problems Identified Other: Traci Romero and Ailyn No present with 15 day old Bobbi for breastfeeding evaluation/assistance. Bobbi was born at 37+4 weeks gestation. Current naked weight is 6 lb 5.9 oz, achieved birthweight today. C/O painful latch, need for nipple shield, sleepy and periods of inactive at breast, pumps/bottle feeds 2-3 times daily using EBM. One bottle per night of EBM in lieu of breastfeeding due to nipple pain. Mix of parent/infant led feedings, remains satated ~ 2.5-3 hours with some one sided feedings lasting ~15 minutes. Tight upper lip noted and discussed as potential for cause related to shallow latch and evidence of accessory muscle use in breastfeeding, evident by blanched upper/lower lip following feeding and head up/down movement during feeding vs rhythmic suckling. Traci Romero is able to pump 2.5-3.5 oz using spectra pump, 2-4 times daily with reported occasional plugged ducts that resolve with emptying of breasts. Maternal history  Maternal history: Hypothyroid  Other/comment:     Breastfeeding goal  Breastfeeding goal: To maintain breast milk feeding per patient goal    Maternal Assessment  Bilateral Breasts: Symmetrical;Soft  Bilateral Nipples: Sore;Slightly everted/short;Colostrum easily expressed  Prior breastfeeding experience (comment below): Primip  Breastfeeding Assistance: Breastfeeding assistance provided with permission    Pain assessment  Pain, additional: Pain location  Pain Location: Nipples  Location/Comment: when nursing without nipple shield  Treatment of Sore Nipples: Coconut oil;Deeper latch techniques; Expressed breast milk    Guidelines for use of:  Equipment: Nipple shield  Breast pump type: Spectra  Current use of pump[de-identified] 2-4 times daily  Suggested use of pump: For comfort as needed; Avoid overstimulation of milk supply;Pump if infant is not latching to breast;Pump after nursing if a nipple shield is used;Pump each time a supplement is offered  Reported pumping volumes (ml): 2.5-3.5 oz  Post-feed pumped volume: Not assessed infant transferred 114ml  Other (comment): see pt instruction

## 2023-04-05 NOTE — PROGRESS NOTES
LACTATION NOTE - MOTHER      Evaluation Type: Outpatient Follow Up    Problems identified  Problems identified: Knowledge deficit;Milk supply WNL; Nipple pain  Problems Identified Other: Rico Story and Ginette Bernheim present with 21 day old Bobbi for follow up breastfeeding evaluation. Update: continue to breastfeed with most feedings with exception of 2-3/day, bottle of EBM offered to satiety 2-4 oz, breastfeeds from both breasts using nipple shield. appears satiated following most feedings, offers a top off with approximately 1-2 oz. Current naked weight is 7 lb 5.4 oz, increase of 16 oz in 8 days. Today's breastfeeding evaluation revealed an improvement in feeding quality at breast although continued nipple pain when latching without nipple shield. Provided information on APNO cream for nipple treatment/healing. See pt instructions    Maternal history  Other/comment:     Breastfeeding goal  Breastfeeding goal: To maintain breast milk feeding per patient goal    Maternal Assessment  Bilateral Breasts: Symmetrical;Full  Bilateral Nipples: Sore; Compression stripe;Colostrum easily expressed;Slightly everted/short  Prior breastfeeding experience (comment below): Primip  Breastfeeding Assistance: Breastfeeding assistance provided with permission    Pain assessment  Pain, additional: Pain location  Pain Location: Nipples  Location/Comment: when nursing without nipple shield  Treatment of Sore Nipples: Deeper latch techniques; Expressed breast milk;Contact MD for [facility]    Guidelines for use of:  Breast pump type: Spectra  Current use of pump[de-identified] 2-3 times daily  Suggested use of pump: Avoid overstimulation of milk supply; For comfort as needed;Pump each time a supplement is offered;Pump after nursing if a nipple shield is used;Pump if infant is not latching to breast  Reported pumping volumes (ml): 4-6 oz  Post-feed pumped volume: Not assessed infant transferred 124ml  Other (comment): see pt instruction

## 2023-04-11 NOTE — DISCHARGE INSTRUCTIONS
.Thank you for seeking care at [facility] Emergency Department. You have been seen and evaluated. We discussed the results of your workup   Please read the instructions provided   If given prescriptions, take as instructed    Your symptoms are likely related to acid reflux, also called Gastroesophageal Reflux Disease (GERD). You should elevate the head of your bed at night to decrease the amount of acid that is refluxing into your esophagus. This can be done either by putting six- to eight-inch blocks under the legs at the head of the bed or a Styrofoam wedge under the mattress, or by using several pillows under your upper back. Also, avoid eating two to three hours before bedtime or lying down soon after eating. Eliminate food triggers of pain - foods that you note to worsen or cause your symptoms. Such foods typically include fatty foods, tomatoes, caffeine, chocolate, spicy foods, food with high fat content, carbonated beverages, and peppermint. Avoid tight-fitting clothes. Remember, your care process does not end after your visit today. Please follow-up with your doctor within 1-2 days for a follow-up check to ensure you are  improving, to see if you need any further evaluation/testing, or to evaluate for any alternate diagnoses. Please return to the emergency department if you develop difficulty breathing, fevers, inability to keep down fluids, chest pain, worsening abdominal pain, or if you develop any other new or concerning symptoms as these could be signs of more serious medical illness. We hope you feel better.

## 2023-04-11 NOTE — ED INITIAL ASSESSMENT (HPI)
Patient presents with chest pain that started around 0500. Per patient had back pain last night that would radiate to the right side under her breast.   Pt states took Tums and went away during breast feeding.      Ems gave 325 aspirin in route     Gave birth 3 weeks ago - had gestational HTN

## 2023-04-14 NOTE — ANESTHESIA POSTPROCEDURE EVALUATION
Patient:  Lesly Elder    Procedure Summary     Date: 04/14/23 Room / Location: [address] [place] [address] ENDOSCOPY 01 / 300 [facility][note 3] ENDOSCOPY    Anesthesia Start: 5188 Anesthesia Stop:     Procedures:       ENDOSCOPIC ULTRASOUND (EUS)      ENDOSCOPIC RETROGRADE CHOLANGIOPANCREATOGRAPHY (ERCP) Diagnosis: (sphincterotomy, Stent placement, stone removal,)    Surgeons: Teri Pang MD Anesthesiologist: Tiffani Aaron CRNA    Anesthesia Type: general ASA Status: 2          Anesthesia Type: general    Vitals Value Taken Time   /82 04/14/23 1704   Temp  04/14/23 1705   Pulse 100 04/14/23 1704   Resp 24 04/14/23 1704   SpO2 97 % 04/14/23 1704       [facility] AN Post Evaluation:   Patient Evaluated in PACU  Patient Participation: complete - patient participated  Level of Consciousness: sleepy but conscious  Pain Score: 0  Pain Management: adequate  Airway Patency:patent  Dental exam unchanged from preop  Yes    Cardiovascular Status: hemodynamically stable  Respiratory Status: nasal cannula  Postoperative Hydration acceptable      Aleja Seth CRNA  4/14/2023 5:05 PM

## 2023-04-14 NOTE — ED QUICK NOTES
PCT Dimas Blanchard, provided patient with hospital breast pump for bedside use. Spoke with patient in another encounter

## 2023-04-14 NOTE — PROGRESS NOTES
Gen Surg  Spoke again with pt.  About possible cholecystectomy after ERCP  Would like to wait and come in as outpt  for lap cholecystectomy  No evidence of cholecystitis  After recovery from ERCP and feels ok then to see me next week and can set up elective outpt lap cholecystectomy

## 2023-04-14 NOTE — ED INITIAL ASSESSMENT (HPI)
Patient arrived from home, c/c midsternum chest pain started Monday, 4 weeks postpartum/vaginal delivery, seen in ED 2 days ago for CP dx of GERD, pain intense tonight and radiates to upper back. +emesis, denies headache, dizziness or diarrhea.

## 2023-04-14 NOTE — ANESTHESIA PROCEDURE NOTES
Airway  Date/Time: 4/14/2023 4:21 PM  Urgency: Elective    Airway not difficult    General Information and Staff    Patient location during procedure: endo  Resident/CRNA: Tianna Lake CRNA  Performed: CRNA   Performed by: Tianna Lake CRNA  Authorized by: Tianna Lake CRNA      Indications and Patient Condition  Indications for airway management: anesthesia  Sedation level: deep  Preoxygenated: yes  Patient position: sniffing  Mask difficulty assessment: 1 - vent by mask    Final Airway Details  Final airway type: endotracheal airway      Successful airway: ETT  Cuffed: yes   Successful intubation technique: direct laryngoscopy  Facilitating devices/methods: intubating stylet and cricoid pressure  Endotracheal tube insertion site: oral  Blade: Stewart  Blade size: #3  ETT size (mm): 7.0    Cormack-Lehane Classification: grade I - full view of glottis  Placement verified by: chest auscultation and capnometry   Measured from: teeth  ETT to teeth (cm): 20  Number of attempts at approach: 1

## 2023-04-14 NOTE — ADDENDUM NOTE
Addendum  created 04/14/23 1707 by Pia Osborne CRNA    Order list changed, Pharmacy for encounter modified

## 2023-04-14 NOTE — ED QUICK NOTES
Orders for admission, patient is aware of plan and ready to go upstairs. Any questions, please call ED RN John Ribera at extension [phone]. Patient Covid vaccination status: Unvaccinated     COVID Test Ordered in ED: None    COVID Suspicion at Admission: N/A    Running Infusions:  None    Mental Status/LOC at time of transport: AOx4     Other pertinent information: Patient able to complete ADL's. Patient breastfeeds and has decided to \"pump and dump\" after receiving pain medications.    CIWA score: N/A   NIH score:  N/A

## 2023-04-15 NOTE — PLAN OF CARE
Problem: Patient Centered Care  Goal: Patient preferences are identified and integrated in the patient's plan of care  Description: Interventions:  - What would you like us to know as we care for you? I just had a baby 4 weeks ago. - Provide timely, complete, and accurate information to patient/family  - Incorporate patient and family knowledge, values, beliefs, and cultural backgrounds into the planning and delivery of care  - Encourage patient/family to participate in care and decision-making at the level they choose  - Honor patient and family perspectives and choices  Outcome: Progressing     Problem: Patient/Family Goals  Goal: Patient/Family Long Term Goal  Description: Patient's Long Term Goal: to return home    Interventions:  - follow MD orders  - monitor labs  - MRCP/ ERCP  - See additional Care Plan goals for specific interventions  Outcome: Progressing  Goal: Patient/Family Short Term Goal  Description: Patient's Short Term Goal: to report and control pain    Interventions:   - medications  - GI consult  - diet as tolerated  - See additional Care Plan goals for specific interventions  Outcome: Progressing     Problem: CARDIOVASCULAR - ADULT  Goal: Maintains optimal cardiac output and hemodynamic stability  Description: INTERVENTIONS:  - Monitor vital signs, rhythm, and trends  - Monitor for bleeding, hypotension and signs of decreased cardiac output  - Evaluate effectiveness of vasoactive medications to optimize hemodynamic stability  - Monitor arterial and/or venous puncture sites for bleeding and/or hematoma  - Assess quality of pulses, skin color and temperature  - Assess for signs of decreased coronary artery perfusion - ex.  Angina  - Evaluate fluid balance, assess for edema, trend weights  Outcome: Progressing  Goal: Absence of cardiac arrhythmias or at baseline  Description: INTERVENTIONS:  - Continuous cardiac monitoring, monitor vital signs, obtain 12 lead EKG if indicated  - Evaluate effectiveness of antiarrhythmic and heart rate control medications as ordered  - Initiate emergency measures for life threatening arrhythmias  - Monitor electrolytes and administer replacement therapy as ordered  Outcome: Progressing     Problem: GASTROINTESTINAL - ADULT  Goal: Minimal or absence of nausea and vomiting  Description: INTERVENTIONS:  - Maintain adequate hydration with IV or PO as ordered and tolerated  - Nasogastric tube to low intermittent suction as ordered  - Evaluate effectiveness of ordered antiemetic medications  - Provide nonpharmacologic comfort measures as appropriate  - Advance diet as tolerated, if ordered  - Obtain nutritional consult as needed  - Evaluate fluid balance  Outcome: Progressing     Problem: METABOLIC/FLUID AND ELECTROLYTES - ADULT  Goal: Electrolytes maintained within normal limits  Description: INTERVENTIONS:  - Monitor labs and rhythm and assess patient for signs and symptoms of electrolyte imbalances  - Administer electrolyte replacement as ordered  - Monitor response to electrolyte replacements, including rhythm and repeat lab results as appropriate  - Fluid restriction as ordered  - Instruct patient on fluid and nutrition restrictions as appropriate  Outcome: Progressing     Patient vital signs stable. Call light and belongings within reach. Patient on clear liquid diet, tolerating well. IV fluids continued. Plan for home once medically cleared.

## 2023-04-15 NOTE — PLAN OF CARE
Patient alert and oriented x 4. Vitals stable on room air. Patient's pain at tolerable level. Patient switched back to regular diet to trial symptom exacerbation. Still feeling OK at the time of this note. Plan to potentially discharge home tonight, pending on doctor's decision. Call light within reach. Problem: Patient Centered Care  Goal: Patient preferences are identified and integrated in the patient's plan of care  Description: Interventions:  - What would you like us to know as we care for you?  From home with  and   - Provide timely, complete, and accurate information to patient/family  - Incorporate patient and family knowledge, values, beliefs, and cultural backgrounds into the planning and delivery of care  - Encourage patient/family to participate in care and decision-making at the level they choose  - Honor patient and family perspectives and choices  Outcome: Progressing     Problem: Patient/Family Goals  Goal: Patient/Family Long Term Goal  Description: Patient's Long Term Goal: to return home    Interventions:  - follow MD orders  - monitor labs  - MRCP/ ERCP  - See additional Care Plan goals for specific interventions  Outcome: Progressing  Goal: Patient/Family Short Term Goal  Description: Patient's Short Term Goal: to report and control pain    Interventions:   - medications  - GI consult  - diet as tolerated  - See additional Care Plan goals for specific interventions  Outcome: Progressing     Problem: CARDIOVASCULAR - ADULT  Goal: Maintains optimal cardiac output and hemodynamic stability  Description: INTERVENTIONS:  - Monitor vital signs, rhythm, and trends  - Monitor for bleeding, hypotension and signs of decreased cardiac output  - Evaluate effectiveness of vasoactive medications to optimize hemodynamic stability  - Monitor arterial and/or venous puncture sites for bleeding and/or hematoma  - Assess quality of pulses, skin color and temperature  - Assess for signs of decreased coronary artery perfusion - ex.  Angina  - Evaluate fluid balance, assess for edema, trend weights  Outcome: Progressing  Goal: Absence of cardiac arrhythmias or at baseline  Description: INTERVENTIONS:  - Continuous cardiac monitoring, monitor vital signs, obtain 12 lead EKG if indicated  - Evaluate effectiveness of antiarrhythmic and heart rate control medications as ordered  - Initiate emergency measures for life threatening arrhythmias  - Monitor electrolytes and administer replacement therapy as ordered  Outcome: Progressing     Problem: GASTROINTESTINAL - ADULT  Goal: Minimal or absence of nausea and vomiting  Description: INTERVENTIONS:  - Maintain adequate hydration with IV or PO as ordered and tolerated  - Nasogastric tube to low intermittent suction as ordered  - Evaluate effectiveness of ordered antiemetic medications  - Provide nonpharmacologic comfort measures as appropriate  - Advance diet as tolerated, if ordered  - Obtain nutritional consult as needed  - Evaluate fluid balance  Outcome: Progressing     Problem: METABOLIC/FLUID AND ELECTROLYTES - ADULT  Goal: Electrolytes maintained within normal limits  Description: INTERVENTIONS:  - Monitor labs and rhythm and assess patient for signs and symptoms of electrolyte imbalances  - Administer electrolyte replacement as ordered  - Monitor response to electrolyte replacements, including rhythm and repeat lab results as appropriate  - Fluid restriction as ordered  - Instruct patient on fluid and nutrition restrictions as appropriate  Outcome: Progressing

## 2023-04-16 NOTE — TELEPHONE ENCOUNTER
Anny Stanley   When does she need to follow up for the bilary stent   Discharged today  Karlie Lopez

## 2023-04-16 NOTE — PROGRESS NOTES
Patient currently packing up to go home. Ambulating in room. States feeling better today, mild persistent epigastric pain. AF/HDS with down trending LFTs. States will follow up with Dr. Marquise Campbell for interval cholecystectomy as planned.      Magalys Manley, PGY4

## 2023-04-16 NOTE — PLAN OF CARE
Patient alert and oriented x 4. Vitals stable on room air. Patient denies pain. Patient cleared for discharge. Plan to follow-up outpatient for cholecystectomy. AVS discussed in detail with patient. She had no further questions. Problem: Patient Centered Care  Goal: Patient preferences are identified and integrated in the patient's plan of care  Description: Interventions:  - What would you like us to know as we care for you? Home w/   - Provide timely, complete, and accurate information to patient/family  - Incorporate patient and family knowledge, values, beliefs, and cultural backgrounds into the planning and delivery of care  - Encourage patient/family to participate in care and decision-making at the level they choose  - Honor patient and family perspectives and choices  Outcome: Progressing     Problem: Patient/Family Goals  Goal: Patient/Family Long Term Goal  Description: Patient's Long Term Goal: to return home    Interventions:  - follow MD orders  - monitor labs  - MRCP/ ERCP  - See additional Care Plan goals for specific interventions  Outcome: Progressing  Goal: Patient/Family Short Term Goal  Description: Patient's Short Term Goal: to report and control pain    Interventions:   - medications  - GI consult  - diet as tolerated  - See additional Care Plan goals for specific interventions  Outcome: Progressing     Problem: CARDIOVASCULAR - ADULT  Goal: Maintains optimal cardiac output and hemodynamic stability  Description: INTERVENTIONS:  - Monitor vital signs, rhythm, and trends  - Monitor for bleeding, hypotension and signs of decreased cardiac output  - Evaluate effectiveness of vasoactive medications to optimize hemodynamic stability  - Monitor arterial and/or venous puncture sites for bleeding and/or hematoma  - Assess quality of pulses, skin color and temperature  - Assess for signs of decreased coronary artery perfusion - ex.  Angina  - Evaluate fluid balance, assess for edema, trend weights  Outcome: Progressing  Goal: Absence of cardiac arrhythmias or at baseline  Description: INTERVENTIONS:  - Continuous cardiac monitoring, monitor vital signs, obtain 12 lead EKG if indicated  - Evaluate effectiveness of antiarrhythmic and heart rate control medications as ordered  - Initiate emergency measures for life threatening arrhythmias  - Monitor electrolytes and administer replacement therapy as ordered  Outcome: Progressing     Problem: GASTROINTESTINAL - ADULT  Goal: Minimal or absence of nausea and vomiting  Description: INTERVENTIONS:  - Maintain adequate hydration with IV or PO as ordered and tolerated  - Nasogastric tube to low intermittent suction as ordered  - Evaluate effectiveness of ordered antiemetic medications  - Provide nonpharmacologic comfort measures as appropriate  - Advance diet as tolerated, if ordered  - Obtain nutritional consult as needed  - Evaluate fluid balance  Outcome: Progressing     Problem: METABOLIC/FLUID AND ELECTROLYTES - ADULT  Goal: Electrolytes maintained within normal limits  Description: INTERVENTIONS:  - Monitor labs and rhythm and assess patient for signs and symptoms of electrolyte imbalances  - Administer electrolyte replacement as ordered  - Monitor response to electrolyte replacements, including rhythm and repeat lab results as appropriate  - Fluid restriction as ordered  - Instruct patient on fluid and nutrition restrictions as appropriate  Outcome: Progressing

## 2023-04-16 NOTE — PLAN OF CARE
Patient a/o x4, on room air, and self care. Heat packs given for mild pain. No other complaints overnight. Patient's needs are met, plan for home today. Safety measure in place, call light is within reach. Problem: Patient Centered Care  Goal: Patient preferences are identified and integrated in the patient's plan of care  Description: Interventions:  - What would you like us to know as we care for you? Home w/   - Provide timely, complete, and accurate information to patient/family  - Incorporate patient and family knowledge, values, beliefs, and cultural backgrounds into the planning and delivery of care  - Encourage patient/family to participate in care and decision-making at the level they choose  - Honor patient and family perspectives and choices  Outcome: Progressing     Problem: Patient/Family Goals  Goal: Patient/Family Long Term Goal  Description: Patient's Long Term Goal: to return home    Interventions:  - follow MD orders  - monitor labs  - MRCP/ ERCP  - See additional Care Plan goals for specific interventions  Outcome: Progressing  Goal: Patient/Family Short Term Goal  Description: Patient's Short Term Goal: to report and control pain    Interventions:   - medications  - GI consult  - diet as tolerated  - See additional Care Plan goals for specific interventions  Outcome: Progressing     Problem: CARDIOVASCULAR - ADULT  Goal: Maintains optimal cardiac output and hemodynamic stability  Description: INTERVENTIONS:  - Monitor vital signs, rhythm, and trends  - Monitor for bleeding, hypotension and signs of decreased cardiac output  - Evaluate effectiveness of vasoactive medications to optimize hemodynamic stability  - Monitor arterial and/or venous puncture sites for bleeding and/or hematoma  - Assess quality of pulses, skin color and temperature  - Assess for signs of decreased coronary artery perfusion - ex.  Angina  - Evaluate fluid balance, assess for edema, trend weights  Outcome: Progressing  Goal: Absence of cardiac arrhythmias or at baseline  Description: INTERVENTIONS:  - Continuous cardiac monitoring, monitor vital signs, obtain 12 lead EKG if indicated  - Evaluate effectiveness of antiarrhythmic and heart rate control medications as ordered  - Initiate emergency measures for life threatening arrhythmias  - Monitor electrolytes and administer replacement therapy as ordered  Outcome: Progressing     Problem: GASTROINTESTINAL - ADULT  Goal: Minimal or absence of nausea and vomiting  Description: INTERVENTIONS:  - Maintain adequate hydration with IV or PO as ordered and tolerated  - Nasogastric tube to low intermittent suction as ordered  - Evaluate effectiveness of ordered antiemetic medications  - Provide nonpharmacologic comfort measures as appropriate  - Advance diet as tolerated, if ordered  - Obtain nutritional consult as needed  - Evaluate fluid balance  Outcome: Progressing     Problem: METABOLIC/FLUID AND ELECTROLYTES - ADULT  Goal: Electrolytes maintained within normal limits  Description: INTERVENTIONS:  - Monitor labs and rhythm and assess patient for signs and symptoms of electrolyte imbalances  - Administer electrolyte replacement as ordered  - Monitor response to electrolyte replacements, including rhythm and repeat lab results as appropriate  - Fluid restriction as ordered  - Instruct patient on fluid and nutrition restrictions as appropriate  Outcome: Progressing

## 2023-04-17 NOTE — TELEPHONE ENCOUNTER
Gi staff:    Please call the patient and let her know i've ordered the abdominal xray I would like her to get next week to see if the pancreatic stent has passed on its own.      Thanks    Venu Melo MD  [address] - Gastroenterology  4/17/2023  4:51 PM

## 2023-04-25 NOTE — PROGRESS NOTES
LACTATION NOTE - MOTHER      Evaluation Type: Outpatient Follow Up    Problems identified  Problems identified: Knowledge deficit;Milk supply WNL  Problems Identified Other: Heather Bryant and Lucien Cherry present with 36 day old Bobbi for follow up breastfeeding evaluation/weight check. Update: Bobbi continues to breastfeed ~6 times daily lasting 30-60 minutes, mostly one sided, uses nipple shield with few successful attempts made at home without it with nipple pain reported after repetitive latches without NS, supplement of EBM given ~1 hour after feeding based on wakefulness and hunger cues, bottle feeding mostly at night up to 5 oz EBM, Nohemy pumps 4-5 times/day yielding 2-3 oz following feeding, 5-8 oz. in lieu of breastfeeding. Current naked weight is 9 lb 5.1 oz, increase of 2 lb in 20 days. Occassional maternal plugged ducts that resolve with massage and emptying reported, information regarding lecithin for tx and prevention of plugged ducts provided, due to concerns related to ineffective feeding patterns of the  likely related to oral restrictions evident by fatigue at breast, need for nipple shield, leaking, audible clicking at start of feeding, white coating on posterior tongue. Bobbi has seen speech therapist one time with follow up planned, exercises provided and performed as recommended most days. Reviewed options for further evaluation if current recommendations of offering second breast at each feeding, decrease is supplementaiton amount/frequency, and mix 24 hour milk collection together to evenly distribute foremilk/hindmilk, watch for plugged ducts and use lecithin as needed. Heather Bryant was recently hospitalized for Gallstones, pumped for milk supply support, milk supply seemingly uneffected.          Breastfeeding goal  Breastfeeding goal: To maintain breast milk feeding per patient goal    Maternal Assessment  Bilateral Breasts: Symmetrical;Full  Bilateral Nipples: Slightly everted/short  Prior breastfeeding experience (comment below): Primip  Breastfeeding Assistance: Breastfeeding assistance provided with permission    Pain assessment  Pain, additional: Pain location;Pain w/initial sucks only  Pain Location: Nipples  Location/Comment: when nursing without nipple shield  Treatment of Sore Nipples: Deeper latch techniques; Expressed breast milk; Lanolin    Guidelines for use of:  Breast pump type: Spectra  Current use of pump[de-identified] 4-5 times/day  Reported pumping volumes (ml): 2-3 oz following BF, 5-8 oz in lieu of BF  Post-feed pumped volume: Not assessed infant transferred 102ml  Other (comment): see pt instruction

## 2023-04-28 NOTE — TELEPHONE ENCOUNTER
Contacted patient and reviewed note below. Patient verbalized understanding. [facility]

## 2023-04-28 NOTE — TELEPHONE ENCOUNTER
GI staff:    Please contact the patient. Let her know the Xray appears to indicate the pancreatic stent has migrated out of the pancreatic duct, which we were hoping it would do. It does not appear to have passed through her intestines/system yet which I anticipate it will.  I would like to repeat the x ray in 2 weeks and have ordered this    Thanks    Adina Wayne MD  [address] - Gastroenterology  4/28/2023  2:16 PM

## 2023-05-09 NOTE — ED QUICK NOTES
Orders for admission, patient is aware of plan and ready to go upstairs. Any questions, please call ED RN [place] [facility] at extension [phone].      Patient Covid vaccination status: Unvaccinated     COVID Test Ordered in ED: None    COVID Suspicion at Admission: N/A    Running Infusions:  None    Mental Status/LOC at time of transport: a/o x 4    Other pertinent information:   CIWA score: N/A   NIH score:  N/A

## 2023-05-09 NOTE — ANESTHESIA PROCEDURE NOTES
Airway  Date/Time: 5/9/2023 4:08 PM  Urgency: elective    Airway not difficult    General Information and Staff    Patient location during procedure: endo  Resident/CRNA: Oziel Pepper CRNA  Performed: CRNA   Performed by: Oziel Pepper CRNA  Authorized by: Oziel Pepper CRNA      Indications and Patient Condition  Indications for airway management: anesthesia  Sedation level: deep  Preoxygenated: yes  Patient position: sniffing  Mask difficulty assessment: 0 - not attempted    Final Airway Details  Final airway type: endotracheal airway      Successful airway: ETT  Cuffed: yes   Successful intubation technique: direct laryngoscopy  Facilitating devices/methods: intubating stylet and anterior pressure/BURP  Endotracheal tube insertion site: oral  Blade: Stewart  Blade size: #3  ETT size (mm): 7.0    Cormack-Lehane Classification: grade IIA - partial view of glottis  Placement verified by: chest auscultation and capnometry   Cuff volume (mL): 6  Measured from: teeth  ETT to teeth (cm): 21  Number of attempts at approach: 1    Additional Comments  Atraumatic insertion, dentition and soft structures as preop. Endo bite block placed by RN.

## 2023-05-09 NOTE — ED QUICK NOTES
MD at bedside. Patient stating she was never given a urine cup to receive sample and has already urinated. Pt given urine cup with education to give sample. Pt verbalized understanding.

## 2023-05-09 NOTE — ED INITIAL ASSESSMENT (HPI)
Pt arrives to ED via [facility] for R side abdominal pain and back pain. Per pt pain started 2h PTA. Denies vision changes. Pt is 7 weeks postpartum. Pt had gestational hypertension in third trimester. Pt states her BP has being controlled after labor.  Pt also c/o of N/V

## 2023-05-10 NOTE — LACTATION NOTE
LACTATION NOTE - MOTHER      Evaluation Type: Inpatient    Problems identified  Problems Identified Other: Per pt RN request, medication and mother's milk information requested. Tony Carrillo is post surgical with instructions to pump/dump 24 hours for post surgical anesthesia. Current medication review: Dilaudid 0.2-0.4 mg (L3) PRN, has not been administred to date, not anticipated at time of discharge home; Oxycodone 2.5-5 mg (L3) PRN, has not been admninistered to date, rx at time of discharge anticipated, handout provided to Abiodun Elmore RN per Medication and Mother's milk, Dr. Nirav Burks. Encouraged lactation visit if needed and to have pt pump to maintain milk supply every 3 hours, hospital grade pump available/recommended. Encouraged to contact lactation with further needs including medication changes/inquiries.

## 2023-05-10 NOTE — PLAN OF CARE
Problem: Patient Centered Care  Goal: Patient preferences are identified and integrated in the patient's plan of care  Description: Interventions:  - What would you like us to know as we care for you?  Need to pump before surgery  - Provide timely, complete, and accurate information to patient/family  - Incorporate patient and family knowledge, values, beliefs, and cultural backgrounds into the planning and delivery of care  - Encourage patient/family to participate in care and decision-making at the level they choose  - Honor patient and family perspectives and choices  Outcome: Progressing     Problem: PAIN - ADULT  Goal: Verbalizes/displays adequate comfort level or patient's stated pain goal  Description: INTERVENTIONS:  - Encourage pt to monitor pain and request assistance  - Assess pain using appropriate pain scale  - Administer analgesics based on type and severity of pain and evaluate response  - Implement non-pharmacological measures as appropriate and evaluate response  - Consider cultural and social influences on pain and pain management  - Manage/alleviate anxiety  - Utilize distraction and/or relaxation techniques  - Monitor for opioid side effects  - Notify MD/LIP if interventions unsuccessful or patient reports new pain  - Anticipate increased pain with activity and pre-medicate as appropriate  5/10/2023 0123 by Vidal Brandon RN  Outcome: Progressing  Note: Patient denies pain during shift  5/10/2023 0123 by Vidal Brandon RN  Note: Patient denies pain during shift     Problem: RISK FOR INFECTION - ADULT  Goal: Absence of fever/infection during anticipated neutropenic period  Description: INTERVENTIONS  - Monitor WBC  - Administer growth factors as ordered  - Implement neutropenic guidelines  5/10/2023 0123 by Vidal Brandon RN  Outcome: Progressing  Note: Patient free from fever  5/10/2023 0123 by Vidal Brandon RN  Note: Patient free from fever     Problem: SAFETY ADULT - FALL  Goal: Free from fall injury  Description: INTERVENTIONS:  - Assess pt frequently for physical needs  - Identify cognitive and physical deficits and behaviors that affect risk of falls.   - [place] fall precautions as indicated by assessment.  - Educate pt/family on patient safety including physical limitations  - Instruct pt to call for assistance with activity based on assessment  - Modify environment to reduce risk of injury  - Provide assistive devices as appropriate  - Consider OT/PT consult to assist with strengthening/mobility  - Encourage toileting schedule  5/10/2023 0123 by Olive Calix, RN  Outcome: Progressing  Note: Patient free from fall during shift  5/10/2023 0123 by Olive Calix, RN  Note: Patient free from fall during shift     Problem: DISCHARGE PLANNING  Goal: Discharge to home or other facility with appropriate resources  Description: INTERVENTIONS:  - Identify barriers to discharge w/pt and caregiver  - Include patient/family/discharge partner in discharge planning  - Arrange for needed discharge resources and transportation as appropriate  - Identify discharge learning needs (meds, wound care, etc)  - Arrange for interpreters to assist at discharge as needed  - Consider post-discharge preferences of patient/family/discharge partner  - Complete POLST form as appropriate  - Assess patient's ability to be responsible for managing their own health  - Refer to Case Management Department for coordinating discharge planning if the patient needs post-hospital services based on physician/LIP order or complex needs related to functional status, cognitive ability or social support system  Outcome: Progressing     Problem: Patient/Family Goals  Goal: Patient/Family Long Term Goal  Description: Patient's Long Term Goal: free of fever    Interventions:  - monitor temp  - See additional Care Plan goals for specific interventions  5/10/2023 0123 by Olive Calix, RN  Outcome: Progressing  Note: Patient free from infection after hospital stay  5/10/2023 0123 by Víctor Turpin RN  Note: Patient free from infection after hospital stay  Goal: Patient/Family Short Term Goal  Description: Patient's Short Term Goal: free of pain    Interventions:   - monitor pain lever  - See additional Care Plan goals for specific interventions  5/10/2023 0123 by Víctor Turpin RN  Outcome: Progressing  Note: Patient free from pain  5/10/2023 0123 by Víctor Turpin RN  Note: Patient free from pain     Patients heart rate 48, doctor ordered tele monitor. Heart rate ranged from 36-48 at night. Nothing to eat or drink after midnight.    Thyroid lab test placed    Preop check list complete and consent signed and placed in chart

## 2023-05-10 NOTE — BRIEF OP NOTE
Patients Name: Deann Lewis  Attending Physician: Cali Nash MD  Operating Physician: Seven Ploanco MD  CSN: [id number]     Location:  OR  MRN: [id number]    YOB: 1990  Admission Date: 5/9/2023  Operation Date: 5/10/2023    Brief Operative Report    Pre-Operative Diagnosis: Cholelithiasis  Choledocholithiasis  Mild cholecystitis  Post-Operative Diagnosis: Same as above. Procedure Performed: Laparoscopic cholecystectomy     Surgeon: Seven Polanco MD    Assistants: Ev Bazzi SA    Anesthesia: General    Attending:  Ky Goldman    EBL: 3cc    Findings: mild acute cholecystitis  cholelithiasis    Specimens:    ID Type Source Tests Collected by Time Destination   1 : 1. Gallbladder and contents Tissue Gallbladder SURGICAL PATHOLOGY TISSUE Higinio Nava MD 5/10/2023 0818    2 : 2.  Cystic Duct Stone Tissue Tissue SURGICAL PATHOLOGY TISSUE Higinio Nava MD 5/10/2023 5480        Drains: none    Complications: None    Disposition: stable     Seven Polanco MD  5/10/2023  8:28 AM

## 2023-05-10 NOTE — OR PREOP
Jevonjerilyn Lesly Patient Status:  Observation    1990 MRN [id number]   Location [place] [address] Attending Rody Manjarrez MD   Hosp Day # 0 PCP Guanako Haines MD     Patient is unable to remove jewelry from right ring finger. Anesthesiologist aware and have deemed it safe to proceed. Patient is aware of risks of proceeding with surgery with jewelry in place. Jewelry taped.

## 2023-05-10 NOTE — ANESTHESIA PROCEDURE NOTES
Airway  Date/Time: 5/10/2023 7:42 AM  Urgency: Elective      General Information and Staff    Patient location during procedure: OR  Anesthesiologist: Rusty Sigala MD  Performed: anesthesiologist   Performed by: Rusty Sigala MD  Authorized by: Rusty Sigala MD      Indications and Patient Condition  Indications for airway management: anesthesia  Sedation level: deep  Preoxygenated: yes  Patient position: sniffing  Mask difficulty assessment: 1 - vent by mask    Final Airway Details  Final airway type: endotracheal airway      Successful airway: ETT  Cuffed: yes   Successful intubation technique: direct laryngoscopy  Endotracheal tube insertion site: oral  Blade size: #4  ETT size (mm): 7.0    Cormack-Lehane Classification: grade I - full view of glottis  Placement verified by: chest auscultation and capnometry   Cuff volume (mL): 7  Measured from: lips  ETT to lips (cm): 21  Number of attempts at approach: 1

## 2023-05-10 NOTE — PLAN OF CARE
Successful lap anat today, minimal pain. This RN reinforced need to wait 24hrs prior to breastfeeding after surgery as stated by anesthesia MD- no further narcotics given after surgery or prescribed for discharge. Incision sites C/D/I x4, mild tenderness. Ok to discharge, follow up with surgery in 1 week. Discharge instructions reviewed bedside with patient, discussed follow up, and postop care. Educational lap anat handout included with paperwork. Patient to discharge home with . No additional questions. Problem: Patient Centered Care  Goal: Patient preferences are identified and integrated in the patient's plan of care  Description: Interventions:  - What would you like us to know as we care for you?  7weeks postpartum  - Provide timely, complete, and accurate information to patient/family  - Incorporate patient and family knowledge, values, beliefs, and cultural backgrounds into the planning and delivery of care  - Encourage patient/family to participate in care and decision-making at the level they choose  - Honor patient and family perspectives and choices  Outcome: Completed     Problem: PAIN - ADULT  Goal: Verbalizes/displays adequate comfort level or patient's stated pain goal  Description: INTERVENTIONS:  - Encourage pt to monitor pain and request assistance  - Assess pain using appropriate pain scale  - Administer analgesics based on type and severity of pain and evaluate response  - Implement non-pharmacological measures as appropriate and evaluate response  - Consider cultural and social influences on pain and pain management  - Manage/alleviate anxiety  - Utilize distraction and/or relaxation techniques  - Monitor for opioid side effects  - Notify MD/LIP if interventions unsuccessful or patient reports new pain  - Anticipate increased pain with activity and pre-medicate as appropriate  Outcome: Completed     Problem: RISK FOR INFECTION - ADULT  Goal: Absence of fever/infection during anticipated neutropenic period  Description: INTERVENTIONS  - Monitor WBC  - Administer growth factors as ordered  - Implement neutropenic guidelines  Outcome: Completed     Problem: SAFETY ADULT - FALL  Goal: Free from fall injury  Description: INTERVENTIONS:  - Assess pt frequently for physical needs  - Identify cognitive and physical deficits and behaviors that affect risk of falls.   - [place] fall precautions as indicated by assessment.  - Educate pt/family on patient safety including physical limitations  - Instruct pt to call for assistance with activity based on assessment  - Modify environment to reduce risk of injury  - Provide assistive devices as appropriate  - Consider OT/PT consult to assist with strengthening/mobility  - Encourage toileting schedule  Outcome: Completed     Problem: DISCHARGE PLANNING  Goal: Discharge to home or other facility with appropriate resources  Description: INTERVENTIONS:  - Identify barriers to discharge w/pt and caregiver  - Include patient/family/discharge partner in discharge planning  - Arrange for needed discharge resources and transportation as appropriate  - Identify discharge learning needs (meds, wound care, etc)  - Arrange for interpreters to assist at discharge as needed  - Consider post-discharge preferences of patient/family/discharge partner  - Complete POLST form as appropriate  - Assess patient's ability to be responsible for managing their own health  - Refer to Case Management Department for coordinating discharge planning if the patient needs post-hospital services based on physician/LIP order or complex needs related to functional status, cognitive ability or social support system  Outcome: Completed     Problem: Altered Communication/Language Barrier  Goal: Patient/Family is able to understand and participate in their care  Description: Interventions:  - Assess communication ability and preferred communication style  - Implement communication aides and strategies  - Use visual cues when possible  - Listen attentively, be patient, do not interrupt  - Minimize distractions  - Allow time for understanding and response  - Establish method for patient to ask for assistance (call light)  - Provide an  as needed  - Communicate barriers and strategies to overcome with those who interact with patient  Outcome: Completed

## 2023-05-11 NOTE — OPERATIVE REPORT
[facility]    PATIENT'S NAME: Jordi Ramachandran   ATTENDING PHYSICIAN: Wilton Pendleton MD   OPERATING PHYSICIAN: Cortes Davis MD   PATIENT ACCOUNT#:   [id number]    LOCATION:  [address] [place] [address] Dr. [facility] RECORD #:   [id number]       YOB: 1990  ADMISSION DATE:       05/09/2023      OPERATION DATE:  05/10/2023    OPERATIVE REPORT    PREOPERATIVE DIAGNOSIS:  Mild acute cholecystitis, cholelithiasis, resolved choledocholithiasis. POSTOPERATIVE DIAGNOSIS:  Mild acute cholecystitis, cholelithiasis, resolved choledocholithiasis. PROCEDURE:  Laparoscopic cholecystectomy. ASSISTANT:  Remus Primrose. ESTELLA Dowling    ANESTHESIA:  General.    BLOOD LOSS:  3 mL. DRAINS:  None. COMPLICATIONS:  None. DISPOSITION:  Stable. INDICATIONS:  Patient presented with acute cholecystitis, cholelithiasis. She did have a delivery about 6 weeks ago. She also had an ERCP done yesterday for choledocholithiasis. FINDINGS:  Mild acute cholecystitis, cholelithiasis. OPERATIVE TECHNIQUE:  Came to the operating room and underwent general endotracheal anesthesia. Compression boots placed. Prepped and draped the abdomen. Began by making a supraumbilical incision under direct vision, inserted the #11 bladeless trocar, the upper midline 11, and 2 lateral 5 mm trocars placed under direct vision. Zulema Patter went to the gallbladder; it was somewhat dilated. There were some adhesions of the omentum over it. These were taken down with blunt dissection. We lifted up the base of the gallbladder, located the cystic duct and cystic artery in 2 views. We could see the duct going directly into the gallbladder. Because of the ERCP that was done yesterday, we felt there was no need for cholangiogram.  We, therefore, clipped the cystic duct, divided cystic duct, Endoloop placed around its end. Cystic artery triply clipped and divided. Gallbladder removed with electrocoagulation.   Placed in an Endobag and removed. Checked for residual bleeding. Everything was okay, Avitene placed on the bed of the liver. All instruments removed under direct vision. Fascia closed with Vicryl, skin with subcuticular Monocryl and Dermabond. Infiltrated 0.5% Marcaine without. No complications. Patient tolerated the procedure well.      Dictated By Ziggy Ramos MD  d: 05/10/2023 08:30:51  t: 05/10/2023 16:40:52  [facility] [id number]  [facility]/

## 2023-06-20 NOTE — PATIENT INSTRUCTIONS
[facility] RN, BSN, [place]  [phone]      Today's Naked Weight: 12 lb 8.3 oz    Recommendations based on today's evaluation: Bobbi presents 3 weeks post tongue/lip tie release with continued challenge in maintaining seal without support and evidence of some accessory muscle use: chomping and cheek dimpling (more without nipple shield) and leaking (with nipple shield). Bobbi transferred 116 ml total (68 ml left breast without nipple shield, 48 ml right breast with 20mm nipple shield). Continued concerns related to frequent plugged ducts without mastitis. FEEDING PLAN    Breastfeeding frequency: Continue to breastfeed with each feeding when able/convenient, offer both breasts when able, including night time, use nipple shield as needed to help maintain seal or for comfort. Continue to offer support and breast compressions during feeding    Supplementation: As needed to satiety (1-2 oz) or in lieu of breastfeeding (3-5 oz). Pumping: Avoid unnecessary pumping, including night time. Pump for comfort and/or supplementation needs. Adjust settings as discussed. Based on today's size assessment, Left nipple flange size 16-18 mm, right nipple 13-15 mm). Visit [employer] web site for compatible spectra flange sets to accommodate comfort and size needs. Follow up: As needed with lactation, with speech as recommended.

## 2023-06-21 NOTE — PROGRESS NOTES
LACTATION NOTE - MOTHER      Evaluation Type: Outpatient Follow Up    Problems identified  Problems identified: Knowledge deficit;Milk supply WNL  Problems Identified Other: Julio César Nettles presents with 1 month old Bobbi for follow up breastfeeding evaluation, 21 days s/p tongue/lip tie release, procedural sites healing well with post procedural exercised completed according to provider instructions. Julio César Nettles reports continued latch difficulties, maintaining seal, long feedings, frequent plugged ducts without mastitis, pumps for 30-40 minutes for fully emptying of breasts. Current naked weight is 12 lb 8.3 oz, 1 oz below weight recorded one day prior at pediatric visit (different scale). Julio César Nettles reports improvement in bottle feeding relating to seal formation, less clicking/leaking following release. Maternal history  Other/comment:     Breastfeeding goal  Breastfeeding goal: To maintain breast milk feeding per patient goal    Maternal Assessment  Bilateral Breasts: Soft;Full  Bilateral Nipples: WNL  Prior breastfeeding experience (comment below): Primip  Breastfeeding Assistance: Breastfeeding assistance provided with permission    Pain assessment  Pain, additional: Pain location  Pain Location: Breasts  Location/Comment: r/t fullness, left breast  Treatment of Sore Nipples: Deeper latch techniques    Guidelines for use of:  Breast pump type: Spectra  Current use of pump[de-identified] increase need for pumping for comfort and supplementation needs  Suggested use of pump: Avoid overstimulation of milk supply; For comfort as needed;Pump after nursing if a nipple shield is used;Pump if infant is not latching to breast;Pump each time a supplement is offered  Reported pumping volumes (ml): 5-8 oz in lieu of breastfeeding, 4 oz post breastfeeding sessions  Post-feed pumped volume: Infant transferred 116 ml total from both breasts, pumping not done following feeding  Other (comment): see pt instruction

## 2023-08-21 NOTE — PROGRESS NOTES
Subjective:   Patient ID: Ezra Marie is a 34year old female. Barb Mims presents for annual exam. She is without concern today. No periods due to breastfeeding. Denies abnormal discharge, vaginal irritation/itching, pelvic pain. 3/2022 had pap that was abnormal, unsure what results were. Had colpo but was early in pregnancy and was told everything looked normal. Instructed to repeat pap postpartum. Sexually active with male partner. Using natural family planning for birth control. Declines STI screening    No family history of breast or ovarian cancer. History/Other:   Review of Systems   Constitutional: Negative. Negative for fever. Respiratory: Negative. Negative for cough, shortness of breath and wheezing. Cardiovascular: Negative. Negative for chest pain and palpitations. Gastrointestinal:  Negative for abdominal pain, constipation, diarrhea and vomiting. Endocrine: Negative. Genitourinary: Negative. Negative for difficulty urinating, dyspareunia, dysuria, genital sores, menstrual problem, pelvic pain, urgency, vaginal bleeding, vaginal discharge and vaginal pain. Neurological: Negative. Negative for headaches. Psychiatric/Behavioral: Negative. Current Outpatient Medications   Medication Sig Dispense Refill    levothyroxine 75 MCG Oral Tab Take 1 tablet (75 mcg total) by mouth before breakfast. 90 tablet 0    hydrocortisone 2.5 % External Cream Apply 1 Application topically 2 (two) times daily as needed. ARMOUR THYROID 90 MG Oral Tab Take 1 tablet (90 mg total) by mouth daily. 2 tabs (180 mg) Monday, Wednesday, Friday  3 tabs (270 mg) Tuesday, Thursday, Saturday, Sunday      Ferrous Gluconate (IRON 27 OR) Take 1 tablet by mouth at bedtime. Cholecalciferol (VITAMIN D-3 OR) Take 5,000 Units by mouth at bedtime. Ascorbic Acid (VITAMIN C OR) Take 500 mg by mouth at bedtime. Omega-3 1000 MG Oral Cap Take 1 capsule by mouth at bedtime.       CALCIUM CARBONATE OR Take 1,000 mg by mouth at bedtime. prenatal vitamin with DHA 27-0.8-228 MG Oral Cap Take 1 capsule by mouth at bedtime. Cyanocobalamin (VITAMIN B 12 OR) Take 5,000 mcg by mouth at bedtime. Allergies:No Known Allergies    Objective:   Physical Exam  Vitals and nursing note reviewed. Constitutional:       General: She is not in acute distress. Appearance: Normal appearance. She is normal weight. She is not ill-appearing, toxic-appearing or diaphoretic. HENT:      Head: Normocephalic and atraumatic. Neck:      Comments: No thyromegaly  Cardiovascular:      Rate and Rhythm: Normal rate and regular rhythm. Heart sounds: Normal heart sounds. No murmur heard. No friction rub. No gallop. Pulmonary:      Effort: Pulmonary effort is normal. No respiratory distress. Breath sounds: Normal breath sounds. No stridor. No wheezing or rhonchi. Chest:   Breasts:     Right: Normal. No swelling, bleeding, inverted nipple, mass, nipple discharge, skin change or tenderness. Left: Normal. No swelling, bleeding, inverted nipple, mass, nipple discharge, skin change or tenderness. Abdominal:      General: Abdomen is flat. Bowel sounds are normal. There is no distension. Palpations: Abdomen is soft. There is no mass. Tenderness: There is no abdominal tenderness. There is no guarding or rebound. Hernia: No hernia is present. Genitourinary:     General: Normal vulva. Labia:         Right: No rash, tenderness, lesion or injury. Left: No rash, tenderness, lesion or injury. Urethra: No prolapse, urethral pain, urethral swelling or urethral lesion. Vagina: No signs of injury and foreign body. No vaginal discharge, erythema, tenderness, bleeding, lesions or prolapsed vaginal walls. Cervix: No cervical motion tenderness, discharge, friability, lesion, erythema, cervical bleeding or eversion.       Uterus: Not deviated, not enlarged, not fixed, not tender and no uterine prolapse. Adnexa:         Right: No mass, tenderness or fullness. Left: No mass, tenderness or fullness. Musculoskeletal:      Cervical back: Normal range of motion and neck supple. No rigidity or tenderness. Lymphadenopathy:      Cervical: No cervical adenopathy. Upper Body:      Right upper body: No supraclavicular, axillary or pectoral adenopathy. Left upper body: No supraclavicular, axillary or pectoral adenopathy. Skin:     General: Skin is warm and dry. Coloration: Skin is not jaundiced or pale. Findings: No bruising, erythema, lesion or rash. Neurological:      Mental Status: She is alert and oriented to person, place, and time. Psychiatric:         Mood and Affect: Mood normal.         Behavior: Behavior normal.         Thought Content: Thought content normal.         Judgment: Judgment normal.         Assessment & Plan:   Women's annual routine gynecological examination  (primary encounter diagnosis)    Orders Placed This Encounter      Hpv Dna  High Risk , Thin Prep Collect      ThinPrep PAP Smear      THIN PREP PAP (Q)      Meds This Visit:  Requested Prescriptions      No prescriptions requested or ordered in this encounter       Imaging & Referrals:  None      Counseled patient on PAP screening guidelines; next PAP in  one year if normal today     Discussed breast awareness  Discussed Multivit with Folic acid and Vitamin D supplementation  Discussed weight management and exercise  Discussed contraceptive options and prevention of STIs.  Desires to continue natural family planning  F/u 1 year or prn

## 2023-11-14 NOTE — PROGRESS NOTES
Subjective:   Patient ID: Layvonne Cooks is a 34year old female. PT c/o itching and discharge x 1 wk scant white discharge. Denies pelvic pain  Has not gotten her peirod b/c still breastfeeding. Going on a cruise and requests Scololamine patch          History/Other:   Review of Systems   Constitutional: Negative. HENT: Negative. Eyes: Negative. Respiratory: Negative. Cardiovascular: Negative. Gastrointestinal: Negative. Genitourinary:  Positive for vaginal discharge. Itching   Musculoskeletal: Negative. Skin: Negative. Neurological: Negative. Psychiatric/Behavioral: Negative. Current Outpatient Medications   Medication Sig Dispense Refill    Scopolamine 1.5mg TD patch 1mg/3days Place 1 patch onto the skin every third day. 2 patch 0    levothyroxine 75 MCG Oral Tab Take 1 tablet (75 mcg total) by mouth before breakfast. 90 tablet 0    ARMOUR THYROID 90 MG Oral Tab Take 1 tablet (90 mg total) by mouth daily. 2 tabs (180 mg) Monday, Wednesday, Friday  3 tabs (270 mg) Tuesday, Thursday, Saturday, Sunday      Ferrous Gluconate (IRON 27 OR) Take 1 tablet by mouth at bedtime. Cholecalciferol (VITAMIN D-3 OR) Take 5,000 Units by mouth at bedtime. Ascorbic Acid (VITAMIN C OR) Take 500 mg by mouth at bedtime. Omega-3 1000 MG Oral Cap Take 1 capsule by mouth at bedtime. CALCIUM CARBONATE OR Take 1,000 mg by mouth at bedtime. Cyanocobalamin (VITAMIN B 12 OR) Take 5,000 mcg by mouth at bedtime. hydrocortisone 2.5 % External Cream Apply 1 Application topically 2 (two) times daily as needed. prenatal vitamin with DHA 27-0.8-228 MG Oral Cap Take 1 capsule by mouth at bedtime. (Patient not taking: Reported on 11/14/2023)       Allergies:No Known Allergies    Objective:   Vitals:    11/14/23 1354   BP: 106/74   Pulse: 74       Physical Exam  Genitourinary:     General: Normal vulva. Exam position: Supine.       Pubic Area: No rash or pubic lice.       Labia:         Right: No rash or lesion. Left: No rash or lesion. Vagina: Normal.      Cervix: Normal. No friability, lesion, erythema or cervical bleeding. Lymphadenopathy:      Lower Body: No right inguinal adenopathy. No left inguinal adenopathy. Assessment & Plan:   1. Vaginitis and vulvovaginitis    2. Screening examination for STD (sexually transmitted disease)        Orders Placed This Encounter   Procedures    Vaginitis Vaginosis PCR Panel    Chlamydia/Gc Amplification       Meds This Visit:  Requested Prescriptions     Signed Prescriptions Disp Refills    Scopolamine 1.5mg TD patch 1mg/3days 2 patch 0     Sig: Place 1 patch onto the skin every third day. Orders Placed This Encounter   Procedures    Vaginitis Vaginosis PCR Panel    Chlamydia/Gc Amplification      Imaging & Referrals:  None    All questions answered. Will treat based on culture results. Pt verbalized understanding.   Leonardo Roa CNM

## 2023-12-24 NOTE — ED INITIAL ASSESSMENT (HPI)
Patient comes in states that she has pink eye and it started last night with discharge, and itchyness.

## 2024-03-06 NOTE — PATIENT INSTRUCTIONS
Healthy Eating Habits During Pregnancy    It’s important to develop healthy eating habits while you are pregnant, for you as well as for your baby. Here are some ways to stay healthy.  Aim for a healthy weight  A slow, steady rate of weight gain is often best. After the first trimester, you may gain about a pound a week. If you were overweight before pregnancy, you need to gain fewer pounds. Your healthcare provider can give you a healthy weight goal for your pregnancy.  Don’t diet  Now is not the time to diet. You may not get enough of the nutrients you and your baby need. Instead, learn how to be a healthy eater. Start by doing it for your baby. Soon, you may do it for yourself.  Vitamins and supplements  Talk with your healthcare provider about taking these and other prenatal vitamins and supplements.  Iron makes the extra blood you need now.  Calcium and vitamin D help build and keep strong bones.  Folic acid helps prevent certain birth defects.  Iodine helps the thyroid work right.  Some vitamins may not be safe to take. Your healthcare provider will tell you which ones to avoid.  Fluids  Drink at least 8 to 10 cups of fluid daily. Your baby needs fluids. Fluids also decrease constipation, flush out toxins and waste, limit swelling, and help prevent bladder infections. Water is best. Other good choices are:  Water or seltzer water with a slice of lemon or lime (These can also help ease an upset stomach.)  Clear soups that are low in salt  Low-fat or fat-free milk, soy or rice milk with calcium added  Popsicles or gelatin  Things to avoid  Some things might harm your growing baby. Don’t eat or drink:  Alcohol  Unpasteurized dairy foods and juices  Raw or undercooked meat, poultry, fish, or eggs  Unwashed fruits and vegetables  Prepared meats, like deli meats or hot dogs, unless heated until steaming hot  Fish that are high in mercury, like shark, swordfish, patsy mackerel, tilefish, and albacore tuna  Things to  limit  Ask your healthcare provider whether it’s safe to eat or drink:  Caffeine  Artificial sweeteners  Organ meats  Certain types of fish  Fish and shellfish that contain mercury in lower amounts, like shrimp, canned light tuna, salmon, pollock, and catfish  [employer] last reviewed this educational content on 2018 The [employer]. [address] [place] [address], [place] [address]. All rights reserved. This information is not intended as a substitute for professional medical care. Always follow your healthcare professional's instructions.        Nutrition During Pregnancy    Having a healthy baby depends mostly on you. What you eat matters to your baby and your health. During pregnancy, you will likely need about 300 more calories per day than before you became pregnant. Each day, try to eat the number of servings listed here for each food group. In addition, cut down on salt and caffeine. Limit the amount of sweets and high-fat foods you eat. Don’t smoke or drink alcohol.  Important: See your healthcare provider as often as requested. If you have any questions, be sure to ask them.  Fruits  2 cups  Examples of 1-cup servings:  1 medium apple  1 medium orange  1 medium banana  1 cup chopped fruit  1 cup 100% fruit juice (pasteurized)  1/2 cup dried fruit Vegetables  2-1/2 to 3 cups   Examples of 1 servin cups raw, leafy greens  1 cup raw or cooked cut-up vegetables  1 cup 100% vegetable juice (pasteurized) Grains & Cereals*  6 to 8 ounces  Examples of 1-ounce servings:  1 slice bread  1/2 cup cooked rice  1/2 cup cooked cereal  1/2 cup pasta  1 ounce cold cereal Fats & Oils  6 to 8 teaspoons   Dairy**  3 cups  Examples of 1-cup servings:  1 cup milk  1 cup yogurt  1-1/2 ounces natural cheese  2 ounces processed cheese Protein---  5 to 6-1/2 ounces  Examples of 1-ounce servings:  1 egg  1 ounce of lean meat, poultry, or fish  1/4 cup cooked beans  1 tablespoon peanut butter  1/2 ounce nuts  Fluids  8 or more 8-ounce glasses  Examples:  Water  Diluted juices: Apple, orange, cranberry  Mineral water  Clear soups, broth     *Note: Choose whole grains whenever possible.  ** Note: Try to choose low-fat options; avoid soft cheeses and unpasteurized milk.  --- Notes: Avoid raw or undercooked meats, eggs, and seafood. fish, and shellfish. Also, some types of fish, like shark, swordfish, and patsy mackerel should not be eaten during pregnancy. Avoid hot dogs, luncheon meats, and cold cuts unless heated to steaming just before being served. Ask your healthcare provider about safe choices.     Prenatal supplements  A prenatal supplement is a pill that you take daily during pregnancy. It helps make sure you’re getting the right amount of certain nutrients that are important to your baby. Ask your healthcare provider to help you choose the best one for you. Important nutrients during pregnancy include:  Folic acid. It's best to start taking this supplement 1 month before you start trying to get pregnant. Folic acid helps prevent certain problems in your baby. During pregnancy, you need to take 400 micrograms (mcg) of folic acid every day for the first 2 to 3 months after conception, and then 600 mcg is needed for growing fetus and placenta.  Iron, calcium, and vitamin D. You may also be advised to take these supplements during pregnancy. They help keep you and your baby healthy. Be sure to take them at different times because calcium makes it hard for the body to absorb iron. Taking iron with orange juice helps to increase its absorption.   [email or web address] last reviewed this educational content on 12/1/2017 © 2000-2020 The [employer]. [address] [place] [address], [place] [address]. All rights reserved. This information is not intended as a substitute for professional medical care. Always follow your healthcare professional's instructions.        Pregnancy: Planning Your Exercise Routine    While you’re pregnant, an  exercise routine helps both your mind and your body feel good. It tones your muscles and makes them stronger. It also gives you and your baby more oxygen.  The right exercise for you  Overall conditioning is best for you and your baby. Try walking, swimming, or riding a stationary bike. Always warm up, cool down, and drink enough fluids. Keep a snack close by in case your blood sugar gets low. Discuss exercise choices with your healthcare provider. Talk about the following:  If you already exercise, find out how to adapt your routine while you’re pregnant. Keep the intensity of the exercise moderate.  Ask if there are any local prenatal exercise classes, like yoga or water aerobics. Find out which prenatal exercise videos are good choices.  If you were not exercising before your pregnancy, find out the best way to start. Now is not the time to begin a new workout on your own. Start slowly. Listen to your body.  Ask which forms of exercise you should avoid. These may include risky activities like hot yoga, horseback riding, scuba diving, skiing, skating, and contact sports.  Pelvic tilts  These help strengthen your stomach muscles and low back. You can do pelvic tilts instead of sit-ups.  Do this exercise on your hands and knees.  Relax the back of your neck. Pull your stomach in until your low back flattens.  Hold for 30 seconds. Release. Repeat 10 times. Do this twice a day.  Kegel exercises  Kegel exercises strengthen the pelvic muscles. Doing Kegels daily helps prepare these muscles for delivery. Kegels also help ease your recovery. You exercise these muscles by tightening, holding, then relaxing them. To do 1 type of Kegel exercise, contract as if you were stopping your urine stream (but do it when you’re not urinating). Hold for 10 seconds, then repeat 10 times, a few times a day.  Tips to add activity  Here are some tips to follow:  Park the car farther from a store and walk.  If you can, do errands on foot  instead of driving.  Walk across the office to talk to someone in person instead of calling.  While waiting for appointments, go up and down stairs or around the block.   Tips to stay active  Here are some tips to follow:  Maintain your routine. But exercise less intensely if you feel tired.  Base your workout on how you feel, not your heart rate. Heart rates aren’t a good way to measure effort during pregnancy.  Avoid exercising on your back after week 16.   What are the warning signs that I should stop exercising?  Stop exercising and call your healthcare provider if you have any of these symptoms:  Vaginal bleeding   Dizziness or feeling faint   Increased shortness of breath   Chest pain   Headache   Muscle weakness   Calf (back of the leg) pain or swelling    Uterine contractions or pre-term labor   Decreased fetal movement   Fluid leaking (or gushing) from your vagina  [employer] last reviewed this educational content on 1/1/2018  © 8228-2003 [employer]. [address] [place] [address]. All rights reserved. This information is not intended as a substitute for professional medical care. Always follow your healthcare professional's instructions.        Exercise During Pregnancy  Regular exercise can help you adapt to the changes your body is going through during pregnancy. Exercising may help you relax, and it gets you ready for labor and delivery. Talk to your healthcare provider about the kinds of activities you can do. Then go ahead and enjoy them.    Get started  Even if you didn’t exercise before pregnancy, it is not too late to start. Choose an activity that you like and that fits your lifestyle. Begin slowly and build up a little at a time. Be sure to check with your healthcare provider before starting any exercise program. The following tips may help you get started:  Choose a time and place to exercise each day.  Wear loose-fitting clothes and comfortable athletic shoes.  Stretch  before and after you exercise. (Be sure to stretch slowly and to hold stretches for 30 to 40 seconds.)  Be active  Unless your healthcare provider says otherwise, try to exercise for 30 minutes or more most days of the week:  Overall conditioning, like swimming, bicycling, or walking, is especially beneficial.  Aerobics and exercises that increase your pulse rate help condition your body and strengthen your heart. Ask about special prenatal aerobics classes.  Exercise safely  These tips will help you have a safe, healthy workout:  Stay cool. Stop exercising if you feel overheated.  Slow down if you’re out of breath. If you can’t talk during exercise, lower the intensity of the workout.  Monitor the intensity of your workout. Only do moderate-intensity (not strenuous) exercise.  Stay off your back. Lying on your back can decrease blood flow to your baby.  Drink water before, during and after your workout.  Eat 300 extra calories a day. A light snack before and after you exercise will help keep your energy up.  Avoid activities requiring balancing skills later in pregnancy.  Do Kegel exercises  Kegel exercises strengthen the pelvic floor muscles used in childbirth. These muscles are the same ones used to stop the flow of urine. Do Kegel exercises daily:  Squeeze your pelvic floor muscles for a count of 3.  Relax, then squeeze again.  Repeat 10 to 15 times in a row at least 3 times a day.  You can do Kegel exercises anytime and anywhere.  Keep walking  No matter what other exercise you do, try to walk whenever you can:  If you’re working all day, take a lunchtime walk in the park with a friend.  When you shop, park away from the store entrance and walk the extra distance.  Take the stairs instead of the elevator.     When to stop exercising and call your healthcare provider  Call your healthcare provider right away if you have:  Shortness of breath before starting exercise  Vaginal bleeding  Dizziness or feeling  faint  Chest pain  Headache  Decreased fetal movement   contractions   Muscle weakness  Calf pain or swelling  Fluid leaking from the vagina   [employer] last reviewed this educational content on 2017 The [employer]. [address] [place] [address], [place] [address]. All rights reserved. This information is not intended as a substitute for professional medical care. Always follow your healthcare professional's instructions.        Pregnancy: Your Weight     Your weight will be checked regularly by your healthcare provider.   Being a healthy weight is important for both you and your baby. The weight you gain now is not just extra fat. It is also the weight of your baby. And it is the increased blood and fluids to support the baby. A slow, steady rate of gain is best. How much you should gain depends on your weight before getting pregnant. Check with your healthcare provider to find out what is right for you.  Talk to your healthcare provider if you have any questions.   If you gain too much  Gaining too much weight might cause you to feel tired or you could have a harder pregnancy or birth. If you and your healthcare provider decide you’re gaining too much:  Eat fewer fats and sugars. Instead, eat fruit, vegetables, and whole-grain foods.  Drink plenty of water between meals.  Get at least 20 minutes of light exercise, like walking, each day.  Don’t diet. You might not get enough of the nutrients you or your baby needs.  Keep a diet diary to help you gauge what and how much you are eating.  If you’re not gaining enough  If you don’t gain enough, your baby could be too small or have health problems. Women tend to gain most of their weight in the second and third trimesters. For now:  Eat many types of foods. Make sure you get enough calcium, protein, and carbohydrates.  Don’t skip meals.  Eat healthy snacks.  Pick nutrient-dense, high-calorie healthy food like trail mix or protein  jolynn.  See a dietitian for help.  Talk to your healthcare provider if you have had an eating disorder or problems with certain foods.  The following are ways to get more calories:  Eat breakfast every day. Peanut butter or a slice of cheese on toast can give you an extra protein boost.  Snack between meals. Yogurt and dried fruits can provide protein, calcium, and minerals.  Try to eat more foods that are high in good fats, like nuts, fatty fish, avocados, and olive oil.  Drink juices made from real fruit that are high in vitamin C or beta carotene, like grapefruit juice, orange juice, papaya nectar, apricot nectar, and carrot juice.  Avoid junk food, like foods high in sugar.  [employer] last reviewed this educational content on 1/1/2018 © 2000-2020 The [employer]. [address] [place] [address]. All rights reserved. This information is not intended as a substitute for professional medical care. Always follow your healthcare professional's instructions.        Dental Care for Pregnant Women  Pregnancy is a time when your oral health needs more attention. Hormone changes during pregnancy can cause certain problems with teeth and gums, and make treatment more complicated.  How pregnancy affects oral health  During pregnancy, hormone changes can cause swollen, bleeding, and irritated gums (gingivitis). Your gums may be very sore, and brushing and flossing may cause discomfort. If left untreated, gingivitis can lead to a more serious gum disease called periodontitis. Severe periodontitis can lead to tooth loss.  Some pregnant women also have small bright-red growths on their gums that bleed easily. These are often called “pregnancy tumors.” They are not cancer. They usually go away right after birth. Talk with your dentist or healthcare provider if you have concerns.  Keeping a healthy mouth  Brush twice daily with fluoride toothpaste. Floss at least once a day.  If you have morning sickness,  rinse your mouth with a teaspoon of baking soda mixed with water after vomiting. Do not brush your teeth right after vomiting. This can remove tooth enamel.  See your dentist for cleanings and checkups more often if needed. This is especially true in your second and third trimesters.  Ask your dentist or healthcare provider if you should use a special mouth rinse to help prevent gingivitis.  Tell your dentist or healthcare provider about any changes in your mouth, such as soreness or bleeding.  Safety concerns  Make sure to tell your dentist that you’re pregnant. He or she can help you stay safe. If you need to have dental X-rays during pregnancy, he or she will make sure you are fully protected. You will wear a lead apron over your belly during the X-ray process. The apron helps block radiation from the X-rays.  If you need to take medicines like antibiotics or pain relievers for dental problems, talk with your healthcare providers first. Your providers will discuss the risks and benefits of taking these during pregnancy.  If you have a high-risk pregnancy, your dentist and your healthcare provider may advise that certain dental treatments wait until after you give birth.  [employer] last reviewed this educational content on 12/1/2017 © 2000-2020 The [employer]. [address] [place] [address], [place] [address]. All rights reserved. This information is not intended as a substitute for professional medical care. Always follow your healthcare professional's instructions.       Pregnancy: Your First Trimester Changes  The first trimester is a time of rapid development for your baby. Because your baby is growing so quickly, it is important that you start a healthy lifestyle right away. By the end of the first trimester, your baby has formed all of its major body organs and weighs just over an ounce.     Actual size of baby is 1/4\"    Month 1 (weeks 1 to 4)  The placenta (the organ that nourishes your baby) begins to  form. The brain, spinal cord, heart, gastrointestinal tract, and lungs begin to develop. Your baby is about 1/4-inch long by the end of the first month.     Actual size of baby is 1\"      Month 2 (weeks 5 to 8)  All of your baby’s major body organs form. The face, fingers, toes, ears, and eyes appear. By the end of the month, your baby is about 1-inch long.     Actual size of baby is 3\"      Month 3 (weeks 9 to 12)  Your baby can open and close its fists and mouth. The sexual organs begin to form. As the first trimester ends, your baby is about 3-inches long.  [employer]Well last reviewed this educational content on 10/1/2017  © 7685-1668 The [employer]. [address] [place] [address], [place] [address]. All rights reserved. This information is not intended as a substitute for professional medical care. Always follow your healthcare professional's instructions.        Adapting to Pregnancy: First Trimester    As your body adjusts, you may have to change or limit your daily activities. You’ll need more rest. You may also need to use the energy you have more wisely.  Your changing body  Almost every part of your body is affected as you adapt to pregnancy. The uterus and cervix will begin to soften right away. You may not look very pregnant during the first 3 months. But you are likely to have some common signs of early pregnancy:  Nausea  Fatigue  Frequent urination  Mood swings  Bloating of the belly  Constipation  Heartburn  Missed or light periods (first trimester bleeding)  Nipple or breast tenderness and breast swelling  It’s not too late to start good habits  What matters most is protecting your baby from this moment on. If you smoke, drink alcohol, or use drugs, now is the time to stop. If you need help, talk with your healthcare provider:  Smoking increases the risk of stillbirth or having a low-birth-weight baby. If you smoke, quit now.  Alcohol and drugs have been linked with miscarriage, birth defects,  intellectual disability, and low birth weight. Do not drink alcohol or take drugs.  Tips to relieve nausea  Although nausea can happen at any time of the day, it may be worse in the morning. To help prevent nausea:  Eat small, light meals at frequent intervals.  Drink fluids often.  Get up slowly. Eat a few unsalted crackers before you get out of bed.  Avoid smells that bother you.  Avoid spicy and fatty foods.  Eat an ice pop in your favorite flavor.  Get plenty of rest.  Ask your healthcare provider about taking kaitlynn or vitamin B6 for nausea and vomiting.  Talk with your healthcare provider if you take vitamins that upset your stomach.  Work concerns  The end of the first trimester is a good time to discuss working during pregnancy with your employer. Follow your healthcare provider’s advice if your job needs you to stand for a long time, work with hazardous tools, or even sit at a desk all day. Your workspace, workload, or scheduled hours may need to be adjusted. Perhaps you can change body postures more often or take an extra break.  Advice for travel  Talk to your healthcare provider first, but the second trimester may be the best time for any travel. You may be advised to avoid certain trips while you’re pregnant. Food and water can be concerns in developing countries. Travel by car is a good choice, as you can stop, get out, and stretch. Bring snacks and water along. Fasten the lap belt below your belly, low over your hips. Also be sure to wear the shoulder harness.  Intimacy  Unless your healthcare provider tells you to, there is no reason to stop having sex while you’re pregnant. You or your partner may notice changes in desire. Desire may be less in the first trimester, due to nausea and fatigue. In the second trimester, sex may be very enjoyable. The third trimester can be a challenge comfort-wise. Try different positions and see what’s best for you both.  StayWell last reviewed this educational content  on 10/1/2017  © 5368-1064 [employer]. [address] [place] [address], [place] [address]. All rights reserved. This information is not intended as a substitute for professional medical care. Always follow your healthcare professional's instructions.        Comfort Tips During Pregnancy    Talk with your healthcare provider before using pain-relieving medicine at any time during your pregnancy.  First trimester tips  Nausea  Get up slowly. Eat a few unsalted crackers before you get out of bed.  Avoid smells that bother you.  Eat small bland low fat, light high-protein meals at frequent intervals.  Sip on water, weak tea, or clear soft drinks, like ginger ale. Eat ice chips.  Fatigue  Take catnaps when you can.  Get regular exercise.  Accept help from others.  Practice good sleep habits, like going to bed and getting up at the same time each day. Use your bed only for sleep and sex.  Mood swings  Talk about your feelings with others, including other mothers.  Limit sugar, chocolate, and caffeine.  Eat a healthy diet. Don’t skip meals.  Get regular exercise.  Headaches  Get fresh air and exercise.  Relax and get enough rest.  Check with your healthcare provider before taking any pain medicines.  Second trimester tips  Here are some suggestions to help you cope:  To limit ankle swelling, sit with your feet raised or wear support hose.  If you have pain in your groin and stomach (round ligament pain), avoid sudden twisting movements.  For leg cramps, flexing your foot often brings immediate relief. You also may try massaging your calf in long, downward strokes, or stretching your legs before going to bed. Get enough exercise and wear shoes with flexible soles.  Third trimester tips  Reducing heartburn  Eat small, light meals throughout the day rather than 3 large ones.  Sleep with your upper body raised 6 inches. Don’t lie down until 2 hours after you eat.  Don't eat greasy, fried, or spicy foods.  Avoid citrus  fruits and juices.  Treating constipation  Eat foods high in fiber (whole-grain foods, fresh fruit and vegetables).  Drink plenty of water.  Get regular exercise.  Discuss other medicines (like docusate and psyllium) with your healthcare provider.  Taking care of your breasts  Avoid using harsh soaps or alcohol, which can cause excessive dryness.  Wear nursing bras. They provide more support than regular bras and can be used after pregnancy if you breastfeed.  Getting a good night’s sleep  Take a warm shower before bed.  Sleep on a firm mattress.  Lie on your side with 1 leg crossed over the other.  Use pillows to support arms, legs, and belly.  [employer] last reviewed this educational content on 10/1/2017  © 3941-8562 [employer]. [address] [place] [address], [place] [address]. All rights reserved. This information is not intended as a substitute for professional medical care. Always follow your healthcare professional's instructions.        Bleeding During Early Pregnancy     Ultrasound can help check the health of your fetus.     If you’ve had bleeding early in your pregnancy, you’re not alone. Many other pregnant women have had early bleeding, too. And in most cases, nothing is wrong. But your healthcare provider still needs to know about it. He or she may want to do tests to find out why you’re bleeding. Call your healthcare provider if you notice bleeding during pregnancy.  What causes early bleeding?  The cause of bleeding early in pregnancy is often unknown. But many factors early on in pregnancy may lead to bleeding or spotting. These include sexual intercourse, which may cause bleeding in any trimester. Here are some other causes:  Implantation of the embryo on the uterine wall  Subchorionic hemorrhage (bleeding between the sac membrane and the uterus)  Miscarriage  Ectopic (tubal) pregnancy  If you notice spotting  Spotting (very light bleeding) is the most common type of bleeding in early  pregnancy. If you notice it, call your healthcare provider. Chances are, he or she will tell you that you can care for yourself at home.  If tests are needed  Depending on how much you bleed, your healthcare provider may ask you to come in for some tests. A pelvic exam, for instance, can help see how far along your pregnancy is. You also may have an ultrasound or a Doppler test. These imaging tests use sound waves to check the health of your fetus. The ultrasound may be done on your belly or inside your vagina. Your healthcare provider also may order a special blood test. This test compares your hormone levels in blood samples taken 2 days apart. The results can help your healthcare provider learn more about the implantation of the embryo. Your blood type will also need to be checked to evaluate whether you will need to be treated for Rh sensitization.   Warning signs  If your bleeding doesn’t stop or if you notice any of the following, seek medical help right away:  Soaking a sanitary pad each hour  Bleeding like you’re having a period  Cramping or severe belly pain  Feeling dizzy or faint  Tissue passing through your vagina  Bleeding at any time after the first trimester  Questions you may be asked  Though not normal, bleeding early in pregnancy is common. If you’ve noticed any bleeding, you may be concerned. But keep in mind that bleeding alone doesn’t mean something is wrong. Call your healthcare provider right away, though. He or she may ask you questions like these to help find the cause of your bleeding:  When did your bleeding start?  Is your bleeding very light (spotting) or is it like a period?  Is the blood bright red or brownish?  Have you had sexual intercourse recently?  Have you had pain or cramping?  Have you felt dizzy or faint?  Monitoring your pregnancy  Bleeding will often stop as quickly as it began. Your pregnancy may go on a normal path again. You may need to make a few extra prenatal visits.  But you and your baby will most likely be fine.  [employer] last reviewed this educational content on 6/1/2016  © 6456-6875 [employer], [employer]. [address] [place] [address], [place] [address]. All rights reserved. This information is not intended as a substitute for professional medical care. Always follow your healthcare professional's instructions.        Abdominal Pain and Early Pregnancy    The tests you had show that you are pregnant, but the exact cause of your pain isn’t clear.   Some pain and bleeding are common early in pregnancy. Often they stop, and you can go on to have a normal pregnancy and baby. Other times the pain or bleeding can be signs of a miscarriage or ectopic pregnancy. An ectopic pregnancy is a very serious problem. At this time it is unclear if your pregnancy will continue normally, if you will have a miscarriage, or if you could have an ectopic pregnancy. Below is some information about this.   Miscarriage  At this time we don’t know whether you will have a miscarriage, or if things will clear up and your pregnancy will continue normally. We understand that this is emotionally difficult. There is little we can say to change the way you feel. But understand that miscarriages are common.   About 1 or 2 out of every 10 pregnancies end this way. Some end even before you know you are pregnant. This happens for a number of reasons, and usually we never figure out why. It’s important you know that it is not your fault. It didn’t happen because you did anything wrong.   Having sex or exercising does not cause a miscarriage. These activities are usually safe unless you have pain or bleeding or your healthcare provider tells you to stop. Even minor falls won’t cause a miscarriage. Miscarriages happen because things were not developing as they were supposed to. No medicine can prevent a miscarriage.   Ectopic pregnancy  In a normal pregnancy, the fertilized egg attaches to the wall of the womb  (uterus). In an ectopic or tubal pregnancy, the fertilized egg attaches outside the uterus, usually in the fallopian tube. Very rarely, the egg attaches to an ovary or somewhere else in the abdomen. An ectopic pregnancy is much less common than a miscarriage, but it is very serious. The baby can't survive, and as it grows it can rupture the tube. This can cause internal bleeding and even death. Risk factors for an ectopic are:   An ectopic pregnancy in the past  Pelvic inflammatory disease (PID)  Endometriosis  Smoking  An IUD  Additional tests  Because we don’t know what’s causing your symptoms, you will need more tests to figure out what the problem is. You may need the following.   Ultrasound  An ultrasound can usually find a normal pregnancy as early as 4 to 5 weeks along. If the ultrasound does not show the baby inside the uterus, it means one of the following.   You have a normal pregnancy less than 4 weeks along  You are having or recently had a miscarriage  You have an ectopic pregnancy  Quantitative HCG  This test measures the amount of a pregnancy hormone in your blood. Comparing today's test result to a repeat test in 2 days will show whether you have a normal pregnancy.   Laparoscopy  This is a type of surgery. The healthcare provider will put a tube with a light inside your belly (abdomen) to look directly at your pelvic organs. This test is used when it is not safe to wait 2 days for blood test results.   Important information  If you do have an ectopic pregnancy, there is a small chance that the growing fetus can tear the fallopian tube. This can cause severe internal bleeding. If this happens, you may have:   Sudden severe pain in your lower abdomen  Vaginal bleeding  Weakness, dizziness, and sometimes fainting  If any of these symptoms occur:  Call 911or return right away to the hospital.  Don't drive yourself.  Don't go to your healthcare provider's office or to a clinic. Go to the hospital.  Home  care  Follow these guidelines to help care for yourself at home:   Rest until your next exam. Don’t do anything strenuous.  Eat a light diet with foods that are easy to digest.  Don’t have sex until your healthcare provider says it’s OK.  Follow-up care  Follow up with your healthcare provider, or as advised. If you were told to have a repeat blood test in 2 days, it’s important to get it done.   If you had an X-ray or ultrasound, a radiologist will review it. You will be told of any new findings that may affect your care.   Call 911  Call 911 if you have any of these:   Severe pain and very heavy bleeding  Severe lightheadedness, passing out, or fainting  Rapid heart rate  Trouble breathing  Confused or difficulty waking up  When to seek medical advice  Call your healthcare provider right away if any of these occur:   The pain in your abdomen gets worse, either suddenly or gradually.  You are dizzy or weak when you stand.  You have heavy vaginal bleeding. This means soaking 1 pad an hour for 3 hours.  You have vaginal bleeding for more than 5 days.  You have repeated vomiting or diarrhea.  The pain in your abdomen moves to the lower right.  You have blood in your vomit or bowel movements. This will be dark red or black.  You have a fever of 100.4ºF (38ºC) or higher, or as directed by your healthcare provider.  [employer] last reviewed this educational content on 9/1/2019 © 2000-2020 [employer]. [address] [place] [address], [place] [address]. All rights reserved. This information is not intended as a substitute for professional medical care. Always follow your healthcare professional's instructions.

## 2024-03-06 NOTE — PROGRESS NOTES
CHIEF COMPLAINT:  Chief Complaint   Patient presents with    Gyn Exam     Missed menses, LMP 24 EED:10/15/2024, No spotting, no cramping. Patient is also still breast feeding         HPI:  Nohemy is 33 year old, female, here today for a missed menses visit.  Currently, she is feeling well. Denies 1st trimester danger signs. Has long cycles, 32-36 day so uncertain dates.   Still breastfeeding but weaning and is taking her prenatal vitamins w/ calcium and vitamin D.    Patient's last menstrual period was 2024.          3/2023 , term, girl, 6 lb 5 oz, denies complications  Current, denies 1T danger signs  LMP 24 --> 8w1 --> NABILA 10/15/24    /FOB: healthy and supportive  Family H/O of genetic disorders: denies  Cats at home: no. Instructed to not change benny litter  Recent Travel outside [place] no    HISTORY:  Past Medical History:   Diagnosis Date    Acute cholecystitis 2023    Calculus of bile duct with acute cholecystitis and obstruction     Chicken pox     Childhood    Decorative tattoo     Dysmenorrhea     Gestational hypertension, third trimester (HCC) 2023    Hypothyroid       Past Surgical History:   Procedure Laterality Date    CHOLECYSTECTOMY        Family History   Problem Relation Age of Onset    Hypertension Father     Lipids Mother     Other (Other) Mother         Hypothyroid    Cancer Brother         Leukemia    Stroke Maternal Grandmother     Other (Other) Maternal Grandfather         Alzheimer's    Cancer Paternal Grandfather         Prostate      Social History:   Social History     Socioeconomic History    Marital status:      Spouse name: Cliff Pierre    Years of education: 16    Highest education level: Bachelor's degree (e.g., BA, AB, BS)   Occupational History    Occupation: Nurse     Comment: Full time   Tobacco Use    Smoking status: Never     Passive exposure: Never    Smokeless tobacco: Never   Vaping Use    Vaping Use: Never used   Substance  and Sexual Activity    Alcohol use: Not Currently     Comment: socially    Drug use: Never    Sexual activity: Yes   Other Topics Concern    Blood Transfusions No    Caffeine Concern No    Occupational Exposure No    Special Diet No    Exercise Yes     Comment: Walking, pilates, stretching    Seat Belt Yes     Social Determinants of Health     Financial Resource Strain: Low Risk  (2/21/2023)    Financial Resource Strain     Difficulty of Paying Living Expenses: Not hard at all     Med Affordability: No   Food Insecurity: No Food Insecurity (2/21/2023)    Food Insecurity     Food Insecurity: Never true   Transportation Needs: No Transportation Needs (2/21/2023)    Transportation Needs     Lack of Transportation: No   Stress: No Stress Concern Present (2/21/2023)    Stress     Feeling of Stress : No   Housing Stability: Low Risk  (2/21/2023)    Housing Stability     Housing Instability: No          Medications (Active prior to today's visit):  Current Outpatient Medications   Medication Sig Dispense Refill    levothyroxine 75 MCG Oral Tab Take 1 tablet (75 mcg total) by mouth before breakfast. 90 tablet 0    ARMOUR THYROID 90 MG Oral Tab Take 1 tablet (90 mg total) by mouth daily. 2 tabs (180 mg) Monday, Wednesday, Friday  3 tabs (270 mg) Tuesday, Thursday, Saturday, Sunday      Ferrous Gluconate (IRON 27 OR) Take 1 tablet by mouth at bedtime.      Cholecalciferol (VITAMIN D-3 OR) Take 5,000 Units by mouth at bedtime.      Ascorbic Acid (VITAMIN C OR) Take 500 mg by mouth at bedtime.      Omega-3 1000 MG Oral Cap Take 1 capsule by mouth at bedtime.      prenatal vitamin with DHA 27-0.8-228 MG Oral Cap Take 1 capsule by mouth at bedtime.      Cyanocobalamin (VITAMIN B 12 OR) Take 5,000 mcg by mouth at bedtime.      Scopolamine 1.5mg TD patch 1mg/3days Place 1 patch onto the skin every third day. (Patient not taking: Reported on 12/24/2023) 2 patch 0    hydrocortisone 2.5 % External Cream Apply 1 Application topically 2  (two) times daily as needed.      CALCIUM CARBONATE OR Take 1,000 mg by mouth at bedtime. (Patient not taking: Reported on 12/24/2023)         Allergies:  No Known Allergies    REVIEW OF SYSTEMS:  Review of Systems   Constitutional: Negative.    Genitourinary: Negative.         PHYSICAL EXAM:  Vitals:    03/06/24 1227   BP: 126/81   Pulse: 73     Physical Exam  Vitals reviewed.   Constitutional:       Appearance: Normal appearance.   HENT:      Head: Normocephalic.   Pulmonary:      Effort: Pulmonary effort is normal.   Neurological:      Mental Status: She is alert and oriented to person, place, and time.   Psychiatric:         Behavior: Behavior normal.         Thought Content: Thought content normal.         Judgment: Judgment normal.          Recent Results (from the past 24 hour(s))   POC Urine pregnancy test [[id number]]    Collection Time: 03/06/24 12:29 PM   Result Value Ref Range    Pregnancy Test, Urine Positive     Control Line Present with a clear background (yes/no) YES Yes/No    Kit Lot # [id number] Numeric    Kit Expiration Date 04/08/2025 Date        ASSESSMENT/PLAN:   Nohemy was seen today for gyn exam.    Diagnoses and all orders for this visit:    Missed menses  -     POC Urine pregnancy test [[id number]]    Pregnancy with uncertain dates in first trimester (HCC)  -     US PREG 1ST TRIMESTER (CPT=76801); Future  -     HCG, Beta Subunit (Quant Pregnancy Test)       Today's UCG positive result reviewed with patient  Appropriate for [facility] care  Already taking prenatal vitamins  baby ASA not indicated. Low risk for pre-eclampsia    Next visit: Patient to schedule Nurse Education visit, next available, and NOB for 12wga    Counseling included:  -- Discussed importance of folic acid, calcium, vitamin D  -- Reviewed pregnancy recommendations regarding weight gain and diet/hydration  -- Travel discussed, avoid travel to zika zones  -- Discussed exercise  -- Warning signs reviewed. Advised to call office if occur  --  Schedule RN OB ED visit   -- I have spent 30 minutes total time on the day of the encounter, including: preparing to see the patient, ordering/reviewing labs, performing a medically appropriate exam, and providing care coordination. face to face counseling, chart review, orders and coordination of care    Pt verbalized understanding. All questions answered. No barriers to learning identified

## 2024-03-19 NOTE — PROGRESS NOTES
Phone Nurse Education Completed  PT verbalized understanding     Orders placed for new OB labs  TSH- Hypothyroidism      Abnormal Pap- Yes, 2022. Had colpo and was normal.   Last Pap-23  Genetic Testing- Desires NIPT, Ruchi kit in RN office  Circumcision- Undecided  Feeding- Breast  Emergency Transfusion- Agreeable   EPDS-0  Type of birth- Desires no epidural   [facility]- current patient     Consults Placed-none      Pt. Has answered NO 5P questions and has NO  risk factors.      Pt. Given What pregnant women need to know handout.        Patient verbalized understanding that [employer] messaging is to be used for non-urgent medical questions, and to call the office with any vaginal bleeding, leaking of fluid, cramping, decreased fetal movement, or any other urgent medical question.

## 2024-03-20 NOTE — PROGRESS NOTES
Nohemy Pierre is  a  at 9.5weeks by LMP c/w 7week US here for her NOB visit.  Currently, she is feeling well. Reports occasional nausea, managed well with small frequent meals. Additionally having vaginal discharge and vaginal itching x 2 days.  Denies 1st trimester danger signs.     NOB labs- ordered   Genetic screening- desires  Med hx: hypothyroidism   Allergies: NKDA. If PCN allergy refer to allergist.   Problem list- Updated  Abuse/Feel safe in home- yes  Pre-e risk: h/o gestation hypertension, orders to start ASA at 12 weeks  Prior births:uncomplicated,  with [facility] midwives  Pap: UTD    VS and weight reviewed, See prenatal physical flowsheet     A:   Patient Active Problem List   Diagnosis    Hypothyroid    History of gestational hypertension    Abnormal LFTs    Hypokalemia    History of abnormal cervical Pap smear    Pregnancy with uncertain dates in first trimester ([facility])       P: seeing pcp at [facility] for management of hypothyroidism, will get 1st trimester labs drawn today,  natara kit given today, vaginosis culture collected, order for terconazole   Next visit: 4 weeks    Reviewed:   Prenatal visit schedule  Risks of Intimate Partner Violence  CNM care  Emergency contact info and safe use of messaging in MyChart  1st trimester precautions and expectations  Nutrition, safety in food, medications, and exercise, and Toxoplasmosis    Pt verbalized understanding. All questions answered. No barriers to learning identified      LENNIE Gonzalez under direct supervision of Nohemy Slaazar CNM    I personally performed the patient's exam and medical decision making on this date of service.  I was physically present in the room for the performance of the E/M service.  I have reviewed the CYNTHIA student's documentation and findings including history, Exam, and Medical Decision Making, edited the document for accuracy and verify that it represents the clinical findings and services performed.

## 2024-03-21 NOTE — PROGRESS NOTES
I personally performed the patient's exam and medical decision making on this date of service.  I was physically present in the room for the performance of the E/M service.  I have reviewed the CNM student's documentation and findings including history, Exam, and Medical Decision Making, edited the document for accuracy and verify that it represents the clinical findings and services performed.     Nohemy was seen today for prenatal care.    Diagnoses and all orders for this visit:    Prenatal care in first trimester (HCC)    Vaginitis and vulvovaginitis  -     Vaginitis Vaginosis PCR Panel; Future  -     Terconazole 0.8 % Vaginal Cream; Place 1 applicator vaginally nightly.  -     Vaginitis Vaginosis PCR Panel

## 2024-03-25 NOTE — TELEPHONE ENCOUNTER
From: Nohemy Pierre  To: Nohemy Salazar  Sent: 3/23/2024 9:24 AM CDT  Subject: Terconazole     Hi Nohemy,   I did the three day dose of Terconazole with not much relief. I am kind of remembering from many years ago trying this for a yeast infection, and it not working for me. Do you think I should try a longer dose or is there another medication I can try? Monistat has worked for me in the past. Thanks.

## 2024-04-12 NOTE — PROGRESS NOTES
Nohemy, , is at 13w0d, here for her TEODORA visit.  Currently, she is feeling well. Denies 1st trimester danger signs. However, her low back pain and sciatica is very bothersome, so much so that she had trouble standing/walking/working this week. She is continuing with her PT and chiropractic.  Her PCP adjusted her hypothyroid meds and is going to increase her Synthroid next.    Vital signs and weight reviewed  See flowsheets  AST/ALT WNL     Assessment/Plan: Low back pain w/ sciatica: Continue PT and chiro at this time. Suggested working less and adding yoga to treatment regimen  Hypothyroid: Continue with plan per PCP. MFM/Level 2 order placed so that pt can schedule for 20wga  H/O gHTN: eRx bASA, pt to initiate next week  Next visit: 4 weeks    Reviewed:   Prenatal visit schedule  Danger signs    Pt verbalized understanding. All questions answered. No barriers to learning identified

## 2024-04-12 NOTE — PATIENT INSTRUCTIONS
Comfort Tips During Pregnancy    Talk with your healthcare provider before using pain-relieving medicine at any time during your pregnancy.  First trimester tips  Nausea  Get up slowly. Eat a few unsalted crackers before you get out of bed.  Avoid smells that bother you.  Eat small bland low fat, light high-protein meals at frequent intervals.  Sip on water, weak tea, or clear soft drinks, like ginger ale. Eat ice chips.  Fatigue  Take catnaps when you can.  Get regular exercise.  Accept help from others.  Practice good sleep habits, like going to bed and getting up at the same time each day. Use your bed only for sleep and sex.  Mood swings  Talk about your feelings with others, including other mothers.  Limit sugar, chocolate, and caffeine.  Eat a healthy diet. Don’t skip meals.  Get regular exercise.  Headaches  Get fresh air and exercise.  Relax and get enough rest.  Check with your healthcare provider before taking any pain medicines.  Second trimester tips  Here are some suggestions to help you cope:  To limit ankle swelling, sit with your feet raised or wear support hose.  If you have pain in your groin and stomach (round ligament pain), avoid sudden twisting movements.  For leg cramps, flexing your foot often brings immediate relief. You also may try massaging your calf in long, downward strokes, or stretching your legs before going to bed. Get enough exercise and wear shoes with flexible soles.  Third trimester tips  Reducing heartburn  Eat small, light meals throughout the day rather than 3 large ones.  Sleep with your upper body raised 6 inches. Don’t lie down until 2 hours after you eat.  Don't eat greasy, fried, or spicy foods.  Avoid citrus fruits and juices.  Treating constipation  Eat foods high in fiber (whole-grain foods, fresh fruit and vegetables).  Drink plenty of water.  Get regular exercise.  Discuss other medicines (like docusate and psyllium) with your healthcare provider.  Taking care  of your breasts  Avoid using harsh soaps or alcohol, which can cause excessive dryness.  Wear nursing bras. They provide more support than regular bras and can be used after pregnancy if you breastfeed.  Getting a good night’s sleep  Take a warm shower before bed.  Sleep on a firm mattress.  Lie on your side with 1 leg crossed over the other.  Use pillows to support arms, legs, and belly.  [employer] last reviewed this educational content on 10/1/2017  © 1713-4019 [employer]. [address] [place] [address]. All rights reserved. This information is not intended as a substitute for professional medical care. Always follow your healthcare professional's instructions.        Adapting to Pregnancy: Second Trimester    Keep up the healthy habits you started in your first trimester. You might be a little more tired than normal. So plan your day wisely. Look at the tips below and choose the ones that suit your lifestyle.  If you have any questions, check with your healthcare provider.  If you work  If you can, adjust your work with your employer to fit your needs. Try these tips:  If you stand for long periods, find ways to do some tasks while sitting. Also, try to stand with 1 foot resting on a low stool or ledge. Shift your weight from foot to foot often. Wear low-heeled shoes.  If you sit, keep your knees level with your hips. Rest your feet on a firm surface. Sit tall with support for your low back.  If you work long hours, ask about adjusting your schedule. Try taking shorter breaks more often.  When you travel  The second trimester may be the best time for any travel. Talk to your healthcare provider about any special plans you may need to make. Always:  Wear a seat belt. Fasten the lap part under your belly. Wear the shoulder part also.  Take breaks often during long trips by car or plane. Move around to stretch your legs.  Drink plenty of fluids on flights. The air in plane cabins is very  dry.  Avoid hot climates or high altitudes if you are not used to them.  Avoid places where the food and water might make you sick.  Make sure you are up-to-date on all immunizations, including the flu vaccine. This is especially important when traveling overseas.  Taking time to relax  Find time to rest and relax at work or at home:  Take short time-outs daily. Do relaxation exercises.  Breathe deeply during stressful times.  Try not to take on too much. Plan tasks for times when you have the most energy.  Take naps when you can. Or just sit and relax.  After week 16, avoid lying on your back for more than a few minutes. Instead, lie on your side. Switch sides often.  Continuing as lovers  Unless your healthcare provider tells you otherwise, there is no reason to stop having sex now. Blood supply increases to the pelvic area in the second trimester. Because of this, sex might be more enjoyable. Try different positions and see what’s best. Also, talk to your partner about any changes in desire. Spotting may happen after sex. Be sure to let your healthcare provider know if there is heavy bleeding.  Keeping your environment safe  You can still clean house and use scented products. Just take some simple precautions:  Wear gloves when using cleaning fluids.  Open windows to let in fresh air. Use a fan if you paint.  Avoid secondhand smoke.  Don’t breathe fumes from nail polish, hair spray, cleansers, or other chemicals.  [employer] last reviewed this educational content on 1/1/2018  © 3605-0979 [employer]. [address] [place] [address], [place] [id number]. All rights reserved. This information is not intended as a substitute for professional medical care. Always follow your healthcare professional's instructions.        Pregnancy: Your Second Trimester Changes  Each day, you and your baby are changing and growing together. Here’s a quick look at what’s happening to both of you.  How you are changing  Even when you  don’t notice it, your body is adapting to meet the needs of your growing baby. The changes in your body might also affect your moods.  Your body  Your uterus expands as baby grows. As the weeks go by, you will feel more pressure on your bladder, stomach, and other organs. You may notice some skin color changes on your forehead, nose, or cheeks. Freckles may darken, and moles may grow. You may notice a darker line on your abdomen between your belly button and pubic bone in the midline.  Your moods  The second trimester is often easier than the first. Still, be prepared for mood swings. These are due to the increase in hormones (chemicals that affect the way organs work) produced by your body. These mood swings are a normal part of pregnancy.  How your baby is growing          Month 4  Baby’s heartbeat may be heard with a Doppler (hand-held ultrasound device) by 9 to 10 weeks. Eyebrows, eyelashes, and fingernails begin to form.  Month 5  You may feel your baby move. After a growth spurt, your baby nears 10 inches. Month 6  Baby’s fingerprints have formed. Your baby weighs about 1 to 2 pounds and is about 12 inches long.   [employer] last reviewed this educational content on 1/1/2018  © 2756-8231 [employer]. [address] [place] [address], [place] [id number]. All rights reserved. This information is not intended as a substitute for professional medical care. Always follow your healthcare professional's instructions.     Pregnancy: Body Changes  From conception (fertilization) until after the birth of your child, you and your baby will change every day. To help you understand what is happening, we’ve outlined how pregnancy begins and some of the changes you may notice.    Your changing body  Pregnancy affects almost every part of your body. You may notice some of the following physical and emotional changes:  Your uterus expands outward and upward as your baby grows. You may feel pressure on your bladder, stomach,  and other organs.  You may notice skin color changes on your forehead, nose, and cheeks. A dark line may form from your bellybutton down to your pubic area. The skin color around your nipples and thighs may also change.  Pink stretch marks may appear on your abdomen, breasts, or hips.  Your hair may seem thicker. You lose less hair during pregnancy.  You may feel fine 1 day and weepy the next. This is caused by changes in your body, like increased hormones. These are chemicals that affect the function of certain organs and also your moods.  You may experience constipation, hemorrhoids, and/or heartburn.  You may experience mild shortness of breath.  Your legs may cramp.  You may have nausea and vomiting.  You may experience dizziness, extreme tiredness, and sleep problems.  You may experience temporary bladder control problems.  Nose bleeds and nasal stuffiness are common.     The fertilized egg travels down the fallopian tube and attaches to the uterus.    How pregnancy begins  Conception is the union of a sperm and an egg. When it happens, your baby’s genetic makeup is complete, even its sex. Fertilization takes place in the fallopian tube. The fertilized egg then travels down this tube to the uterus (womb). The egg attaches to the lining of the uterus about a week after conception. There it grows and is nourished.  [employer] last reviewed this educational content on 1/1/2018  © 3641-5788 [employer]. [address] [place] [address], [place] [address]. All rights reserved. This information is not intended as a substitute for professional medical care. Always follow your healthcare professional's instructions.        Pregnancy: Common Questions  There are plenty of myths and “old wives’ tales” surrounding pregnancy. You may need help  fact from fiction. On this sheet, you’ll find answers to a few common questions. If you have other questions, talk with your healthcare provider.     Will working harm  my baby?  In most cases, working throughout your pregnancy is not harmful at all. There may be concerns if the job involves dangerous machinery or chemicals, lifting, or standing for very long periods of time. Talk to your healthcare provider and employer about your particular job and pregnancy.   Is it safe to have sexual relations during pregnancy?  Yes, unless you are specifically instructed not to by your healthcare provider.  Why can’t I change the cat litter box?  Cats carry a disease called toxoplasmosis. In adult humans, it shows up as a mild infection of the blood and organs. If you are infected during pregnancy, the baby’s brain and eyes could be damaged. To be safe, have someone else change the litter. If you must handle it, wear a paper mask over your nose and mouth. Also, wear gloves and wash your hands afterward.   Which medicines are safe?  No prescription or over-the-counter medicine is safe for everyone all of the time. But sometimes medicines are needed.  Be sure your healthcare provider knows you are pregnant. Then use only the medicines he or she advises you to take.   Is it true that I can overheat my baby?  Yes. To avoid making your baby too warm:  Don’t sit in a Jacuzzi. A long, warm bath is fine, but not in water over 100°F (37.7°C).  Exercise less intensely if you feel fatigued. Base your workout on how you feel, not your heart rate. Heart rates aren’t a good way to measure effort during pregnancy.  Can I lift and carry safely?  Yes, if your healthcare provider doesn’t tell you otherwise. Learn to lift and carry safely to avoid injury and reduce back pain during pregnancy. To protect your back:   Bend at the knees to bring the load nearer.  Get a good . Test the weight of the load.  Tighten your belly. Exhale as you lift.  Lift with your legs, not with your back.  Carry the load close to your body.  Hold the load so you can see where you are going.  What if I get sick?  Most women get  sick at least once during pregnancy. Talk with your healthcare provider if you do. Most likely it will not affect your pregnancy. Get plenty of rest and fluids, and eat what you can. Talk to your healthcare provider before taking any medicines.   [employer] last reviewed this educational content on 10/1/2017  © 6567-5544 [employer]. [address] [place] [address], [place] [address]. All rights reserved. This information is not intended as a substitute for professional medical care. Always follow your healthcare professional's instructions.        Pregnancy: More Common Questions  On this sheet, you’ll find answers to some common questions about pregnancy. If you have other questions, talk with your healthcare provider.    Will traveling be too stressful?  Not if you set the pace. When you plan a trip, allow time to stop and rest. You may even want to postpone travel until the second trimester, when your body is more adjusted to pregnancy. Don’t wait as long as the third trimester, because of the possibility of going into early labor. Travel to a location where healthcare facilities are close by. You may want to take a copy of your records with you in case you need medical care when you are traveling.  Can I still be a vegetarian?  Yes. Be sure to consult a registered dietitian. And be sure to get enough of the following:  Protein. Eat eggs and milk if you’re an ovo-lacto vegetarian. Eat vegetable proteins, like tofu and beans, if you’re a vegan.  Calcium. If you don’t eat dairy, try soy milk, soy cheese fortified with calcium, and orange juice fortified with calcium.  Vitamin B12. You may need to take a supplement that includes folic acid.  Vitamin D. If you don’t drink milk, ask about taking a supplement.  Iron. Your healthcare provider may recommend a supplement.  Can I paint my nails?  Yes. Nail polish is not thought to be dangerous during pregnancy. Just be careful about breathing the fumes. Keep windows  open or use a fan. However, long-term exposure to the solvents used to remove nail polish may not be safe. If you work in a nail salon, talk with your healthcare provider about safety concerns.  Should I eat more if I exercise?  You will only need an extra 100 calories for each 30 minutes of mild exercise. But you’ll also need more fluids. Drink at least an extra 8 ounces of water.  Do I have to stop jogging?  You can jog as long as you are comfortable. Many women find the impact or bounce of a jog doesn’t feel good. Some switch to brisk walking as their pregnancy advances.  What should I limit or avoid eating or drinking?  Don't drink unpasteurized milk products or juices.  Don't eat raw or undercooked meat, poultry, fish, or eggs.   Don't eat prepared meats, like hot dogs or deli meat, unless served steaming hot.  Don't drink alcohol.  Limit caffeine unless your healthcare provider tells you otherwise.    Can I lift weights?  If you have been lifting weights, there is no need to stop. Instead, keep the weights light and in control. Never hold your breath. If you haven’t been lifting weights, don’t start now.  [employer] last reviewed this educational content on 10/1/2017  © 8138-1206 The [employer]. [address] [place] [address], [place] [address]. All rights reserved. This information is not intended as a substitute for professional medical care. Always follow your healthcare professional's instructions.

## 2024-05-14 NOTE — PROGRESS NOTES
Nohemy Pierre is a 34 year old  pt at 18w0d here for TEODORA  She is feeling tired, still a little nauseous. C/o lower left back pain.  She is doing PT, chiropractor, accupuncture.   Endocrinologist appointment today  Taking Levothyroxine 88mcg and baby ASA  Completed clotrimaozle for vaginal yeast    ROS:  Denies cramping, bleeding, leaking of fluid.  Fetus is active.  Quickening 2 wks ago    Vitals:    24 1333   BP: 118/73   Pulse: 71   Weight: 142 lb (64.4 kg)      See flowsheet  TW lbs (25-35 lbs TWG)    Assessment/Plan:  IUP at 18w0d    1. Encounter for supervision of other normal pregnancy in second trimester (HCC)    2. Other specified hypothyroidism    3. History of gestational hypertension       Reviewed:  Danger Signs  20 wk US sched for 24  RTC 4 wk(s)    I have spent 20 minutes total time on the day of the encounter, including: preparing to see the patient, ordering/reviewing labs, performing a medically appropriate exam, and providing care coordination. face to face counseling, chart review, orders and coordination of care     Pt verbalized understanding.  All questions answered.  No barriers to learning identified

## 2024-05-31 NOTE — PROGRESS NOTES
Reason for Consult:   Dear Ms. Kay,    Thank you for requesting ultrasound evaluation and maternal fetal medicine consultation on Nohemy Pierre.  As you are aware she is a 34 year old female with a [facility] pregnancy at 20w3d.  A maternal-fetal medicine consultation was requested secondary to  hypothyroidism.  Her prenatal records and labs were reviewed.  H/o gestational hypertension.  Review of History:     OB History:    OB History    Para Term  AB Living   3 1 1 0 1 1   SAB IAB Ectopic Multiple Live Births   1 0 0 0 1      # Outcome Date GA Lbr Nickolas/2nd Weight Sex Type Anes PTL Lv   3 Current            2 Term 23 37w4d / 01:32 6 lb 5.9 oz (2.89 kg) F NORMAL SPONT EPI, Local N NOAM      Complications: Decreased FHR variability, Variable decelerations, PIH      Name: SAI PIERRE      Apgar1: 7  Apgar5: 9   1 2022                     Allergies:  No Known Allergies   Current Meds:  Current Outpatient Medications   Medication Sig Dispense Refill    levothyroxine 88 MCG Oral Tab       NP THYROID 15 MG Oral Tab Take 3 tablets (45 mg total) by mouth daily.      aspirin (ASPIRIN 81) 81 MG Oral Chew Tab Take 1.5 tablets by mouth daily 90 tablet 2    levothyroxine 75 MCG Oral Tab Take 1 tablet (75 mcg total) by mouth before breakfast. 90 tablet 0    ARMOUR THYROID 90 MG Oral Tab Take 30 mg by mouth daily. 2 tabs (180 mg) Monday, Wednesday, Friday  3 tabs (270 mg) Tuesday, Thursday, Saturday,  (Patient not taking: Reported on 2024)      Ferrous Gluconate (IRON 27 OR) Take 1 tablet by mouth at bedtime.      Cholecalciferol (VITAMIN D-3 OR) Take 5,000 Units by mouth at bedtime.      Ascorbic Acid (VITAMIN C OR) Take 500 mg by mouth at bedtime.      Omega-3 1000 MG Oral Cap Take 1 capsule by mouth at bedtime.      CALCIUM CARBONATE OR Take 1,000 mg by mouth at bedtime. (Patient not taking: Reported on 2023)      prenatal vitamin with DHA 27-0.8-228 MG Oral Cap Take 1 capsule  by mouth at bedtime.      Cyanocobalamin (VITAMIN B 12 OR) Take 5,000 mcg by mouth at bedtime.          HISTORY:  Past Medical History:    Acute cholecystitis    Calculus of bile duct with acute cholecystitis and obstruction    Chicken pox    Childhood    Decorative tattoo    Dysmenorrhea    Gestational hypertension, third trimester (HCC)    Hypokalemia    Hypothyroid      Past Surgical History:   Procedure Laterality Date    Cholecystectomy        Family History   Problem Relation Age of Onset    Hypertension Father     Lipids Mother     Other (Other) Mother         Hypothyroid    Cancer Brother         Leukemia    Stroke Maternal Grandmother     Other (Other) Maternal Grandfather         Alzheimer's    Cancer Paternal Grandfather         Prostate      Social History     Socioeconomic History    Marital status:      Spouse name: Cliff Pierre    Years of education: 16    Highest education level: Bachelor's degree (e.g., BA, AB, BS)   Occupational History    Occupation: Nurse     Comment: Full time   Tobacco Use    Smoking status: Never     Passive exposure: Never    Smokeless tobacco: Never   Vaping Use    Vaping status: Never Used   Substance and Sexual Activity    Alcohol use: Not Currently     Comment: socially    Drug use: Never    Sexual activity: Yes   Other Topics Concern    Blood Transfusions No    Caffeine Concern No    Occupational Exposure No    Special Diet No    Exercise Yes     Comment: Walking, pilates, stretching    Seat Belt Yes     Social Determinants of Health     Financial Resource Strain: Low Risk  (3/20/2024)    Financial Resource Strain     Difficulty of Paying Living Expenses: Not hard at all     Med Affordability: No   Food Insecurity: No Food Insecurity (3/20/2024)    Food Insecurity     Food Insecurity: Never true   Transportation Needs: No Transportation Needs (3/20/2024)    Transportation Needs     Lack of Transportation: No   Stress: No Stress Concern Present (3/20/2024)     Stress     Feeling of Stress : No   Housing Stability: Low Risk  (3/20/2024)    Housing Stability     Housing Instability: No        NARRATIVE:     /72   Pulse 76   Wt 146 lb (66.2 kg)   LMP 2024 (Exact Date)   BMI 25.86 kg/m²           Alert and Oriented.  No acute distress          Abdomen:  soft, nontender, no contractions noted.           extremities:  nontender, no edema             DISCUSSION  During her visit we discussed and reviewed the following issues:    Overt hypothyroidism (elevated TSH, reduced free T4) complicating pregnancy is unusual. Two factors contribute to this finding: some hypothyroid women are anovulatory, and hypothyroidism (new or inadequately treated) complicating pregnancy is associated with an increased rate of first trimester spontaneous .  The risk of complications during pregnancy is lower in women with subclinical, rather than overt hypothyroidism. However, in some studies, women with subclinical hypothyroidism were also reported to be at increased risk for severe preeclampsia,  delivery, placental abruption, and/or pregnancy loss.  Isolated maternal hypothyroxinemia (low T4) is defined as a maternal free T4 concentration in the lower 5th or 10th percentile of the reference range, in conjunction with a normal TSH. The effect of isolated maternal hypothyroxinemia on  and  outcome is unclear.     Hypothyroidism has been associated with an increased risk of several complications, including:  ?Preeclampsia and gestational hypertension  ?Placental abruption  ?Nonreassuring fetal heart rate tracing  ? delivery, including very  delivery (before 32 weeks)  ?Low birth weight  ?Increased rate of  section  ? morbidity and mortality  ?Neuropsychological and cognitive impairment  ?Postpartum hemorrhage    All pregnant women with newly diagnosed overt hypothyroidism (thyroid-stimulating hormone [TSH] above  trimester-specific normal reference range with low free thyroxine [T4]) should be treated with thyroid hormone (T4). In addition, because maternal euthyroidism is potentially important for normal fetal cognitive development, it is suggested to treat pregnant women with subclinical hypothyroidism.  Because of the changes in thyroid physiology during normal pregnancy, thyroid function tests should be interpreted using trimester-specific TSH and T4 reference ranges for pregnant women. The upper limit of normal for TSH in the first trimester of pregnancy is approximately 2.5 mU/L (3.0 mU/L in the second and third trimesters) rather than 4.5 to 5.0 mU/L used by most laboratories. Total T4 and T3 levels during pregnancy are 1.5-fold higher than in nonpregnant women.   We do not suggest the treatment of pregnant women with isolated hypothyroxinemia (low free T4, normal TSH).  Thyroid function should be monitored throughout the pregnancy by assessing TSH and FT4 or FT3 about every 4-8 weeks and more frequently if the clinical situation dictates. Adjust the dose appropriately with the goal of maintaining the FT3/FT4 in the upper levels of the normal range and avoid over treatment. Perform ultrasound in the third trimester and as needed to assess growth and potential goiter.   ------------------------------------------    Prevention of Preeclampsia    Antiplatelet Agents  The observation that preeclampsia is associated with increased platelet turnover and increased platelet-derived thromboxane levels led to randomized trials evaluating low-dose aspirin therapy in women thought to be at increased risk of the disease. As opposed to higher dose aspirin therapy, low-dose aspirin (60 to 150 mg per day) diminishes platelet thromboxane synthesis while maintaining vascular wall prostacyclin synthesis. Although not well studied, the beneficial effect of low-dose aspirin for prevention of preeclampsia may also be in part related to  modulation of inflammation. The drug has been studied for both prevention of preeclampsia and prevention of progression from mild to severe disease. It appears to result in a modest reduction in risk of preeclampsia when given to women at moderate to high risk of preeclampsia.  This approach has been studied in over 35,000 women, for both prevention of preeclampsia and prevention of progression of the disease. Low dose aspirin has a modest impact on pregnancy outcome; it reduces the risk of preeclampsia, as well as other adverse pregnancy outcomes (eg,  delivery, fetal growth restriction) by about 10 to 20 percent. Low dose aspirin is safe in pregnancy; thus, it is a reasonable strategy in women with a moderate to high risk of preeclampsia in whom the benefits outweigh the risks.     Clinical Guidelines  Based on the available data, low-dose aspirin is advised for women at high risk for preeclampsia. There is no consensus on the criteria that confer high risk. The [place] Preventive Services Task Force (USPSTF) risk criteria are reasonable.  USPSTF criteria for high risk include one or more of the following:   Previous pregnancy with preeclampsia, especially early onset and with an adverse outcome   Multifetal gestation  Chronic hypertension   Type 1 or 2 diabetes mellitus  Chronic kidney disease  Autoimmune disease (antiphospholipid syndrome, systemic lupus erythematosus)     Women with multiple moderate risk factors for preeclampsia are considered high risk, as well. Identification of women with an appropriate combination of moderate risk factors to be considered high risk is subjective and determined case by case, as the data describing the magnitude of the association between each of these risk factors and development of preeclampsia vary widely and lack consistency. USPSTF criteria for moderate risk include:  Nulliparity  Obesity (body mass index >30 kg/m2)  Family history of preeclampsia in mother  or sister  Age ?35 years  Sociodemographic characteristics (, low socioeconomic level)  Personal risk factors (eg, history of low birth weight or small for gestational age, previous adverse pregnancy outcome, >10 year pregnancy interval)     If used, daily low-dose aspirin should be initiated in the 12th or 13th week of gestation, although adverse effects from earlier initiation (eg, preconception) have not been documented. The optimum low dose is unclear; 81 mg is readily available, but 100 or 150 mg (which are not available in some countries including the [place] may be more effective.  In some pregnant women, platelet function is not inhibited by the 81 mg dose but is altered by higher dosing. Hence, current evidence has suggested a daily dose of 100 to 150 mg of aspirin rather than a lower dose. In the [place], this can be achieved by taking one and one-half 81 mg tablets; however, taking one 81 mg tablet is also reasonable since this is the commercially available dose and has proven efficacy. For prevention of preeclampsia, no trials have evaluated the efficacy of a higher dose of aspirin, which could be achieved easily by taking two 81 mg tablets or splitting a 325 mg tablet. For prevention of myocardial infarction and stroke in nonpregnant individuals, aspirin appears to be equally effective at doses between 75 and 325 mg per day.  The safety of low-dose aspirin use in the second and third trimesters is well established, but questions linger regarding use in the first trimester (possible increase in minor vaginal bleeding or gastroschisis). Early therapy (before 16 weeks) may be important since the pathophysiologic features of preeclampsia develop at this time, weeks before clinical disease is apparent. However, available evidence is inconsistent about the importance of early initiation of therapy, possibly because aspirin has major effects on prostacyclin production and endothelial  function throughout gestation.  Aspirin is discontinued 5 to 10 days before expected delivery to diminish the risk of bleeding during delivery; however, no adverse maternal or fetal effects related to low-dose aspirin have been proven.  Major questions remain as to which, if any, subgroups of women are more likely to benefit from low-dose aspirin therapy, when treatment should be started (first or second trimester), and the optimum dose to inhibit placental PGH synthase (cyclooxygenase) and also allow the anti-inflammatory effects of low-dose aspirin.         OB ULTRASOUND REPORT   See imaging tab for complete ultrasound report or in PACS    Single IUP in breech presentation.    Placenta is posterior, high.   A 3 vessel cord is noted.  Cardiac activity is present at 146 bpm   g ( 0 lb 13 oz);   MVP is 5.8 cm .         Fetal Anatomy:  Visualized with normal appearance: head, face, spine, neck, skin, chest, abdominal wall, gastrointestinal tract, kidneys, bladder, extremities.   Brain: Visualized and normal appearances: brain parenchyma, cerebral ventricles, choroid plexus, Cisterna Magna, midline falx, cerebellum, cerebellar lobes, posterior fossa, vermis, cavum septi pellucidi.  Face: eyes normal, profile normal, nose normal, lip normal, palate normal.  Heart: visualized and normal appearance: 3 vessel view, four-chamber, left outflow tract, right outflow tract, arches.      Genetic Sonogram:  Nuchal fold normal.  Pyelectasis absent.  No hyperechogenic bowel.  Echogenic intracardiac foci absent. Nasal bone present. Choroid plexus cyst absent.      Summary of Ultrasound findings:  This is a [facility] pregnancy    The fetal measurements are consistent with established EDC. No gross ultrasound evidence of structural abnormalities are seen today. No minor markers for aneuploidy are seen. The patient understands that ultrasound cannot rule out all structural and chromosomal abnormalities.       IMPRESSION:   1.  IUP @  20w3d  2. Scan consistent with dates  3. No fetal structural abnormalities seen  4. hypothyroidism    RECOMMENDATIONS:   1.  Weekly NST at 36wks  2.  Growth US at 32wks  3.  Check thyroid function every 6-8 wks  4.  Aspirin 81mg daily    Thank you for allowing me to participate in the care of your patient.  Please do not hesitate to call with any questions or concerns.     Total time spent   45  minutes this calendar day which includes preparing to see the patient including chart review, obtaining and/or reviewing additional medical history, performing a physical exam and evaluation, documenting clinical information in the electronic medical record, independently interpreting results, counseling the patient, communicating results to the patient/family/caregiver and coordinating care.    Baljit Soto D.O.  Maternal Fetal Medicine     Note to patient and family:  The 21st Century Cures Act makes medical notes available to patients in the interest of transparency.  However, please be advised that this is a medical document.  It is intended as a peer to peer communication.  It is written in medical language and may contain abbreviations or verbiage that are technical and unfamiliar.  It may appear blunt or direct.  Medical documents are intended to carry relevant information, facts as evident, and the clinical opinion of the practitioner.

## 2024-06-12 NOTE — PROGRESS NOTES
Nohemy, , is at 22w1d, here for her [facility] visit.  Currently, she is feeling well. Denies 2nd trimester danger signs.     Vital signs and weight reviewed  See flowsheets   Anatomy scan WNL/AFP negative and reviewed with patient    Assessment/Plan: Care is up to date  Next visit: 4 weeks. 1hr GTT/CBC then    Reviewed:   Prenatal visit schedule  Kick counts  Danger signs    Pt verbalized understanding. All questions answered. No barriers to learning identified

## 2024-06-12 NOTE — PATIENT INSTRUCTIONS
Comfort Tips During Pregnancy    Talk with your healthcare provider before using pain-relieving medicine at any time during your pregnancy.  First trimester tips  Nausea  Get up slowly. Eat a few unsalted crackers before you get out of bed.  Avoid smells that bother you.  Eat small bland low fat, light high-protein meals at frequent intervals.  Sip on water, weak tea, or clear soft drinks, like ginger ale. Eat ice chips.  Fatigue  Take catnaps when you can.  Get regular exercise.  Accept help from others.  Practice good sleep habits, like going to bed and getting up at the same time each day. Use your bed only for sleep and sex.  Mood swings  Talk about your feelings with others, including other mothers.  Limit sugar, chocolate, and caffeine.  Eat a healthy diet. Don’t skip meals.  Get regular exercise.  Headaches  Get fresh air and exercise.  Relax and get enough rest.  Check with your healthcare provider before taking any pain medicines.  Second trimester tips  Here are some suggestions to help you cope:  To limit ankle swelling, sit with your feet raised or wear support hose.  If you have pain in your groin and stomach (round ligament pain), avoid sudden twisting movements.  For leg cramps, flexing your foot often brings immediate relief. You also may try massaging your calf in long, downward strokes, or stretching your legs before going to bed. Get enough exercise and wear shoes with flexible soles.  Third trimester tips  Reducing heartburn  Eat small, light meals throughout the day rather than 3 large ones.  Sleep with your upper body raised 6 inches. Don’t lie down until 2 hours after you eat.  Don't eat greasy, fried, or spicy foods.  Avoid citrus fruits and juices.  Treating constipation  Eat foods high in fiber (whole-grain foods, fresh fruit and vegetables).  Drink plenty of water.  Get regular exercise.  Discuss other medicines (like docusate and psyllium) with your healthcare provider.  Taking care  of your breasts  Avoid using harsh soaps or alcohol, which can cause excessive dryness.  Wear nursing bras. They provide more support than regular bras and can be used after pregnancy if you breastfeed.  Getting a good night’s sleep  Take a warm shower before bed.  Sleep on a firm mattress.  Lie on your side with 1 leg crossed over the other.  Use pillows to support arms, legs, and belly.  [employer] last reviewed this educational content on 10/1/2017  © 8403-7350 [employer]. [address] [place] [id number]. All rights reserved. This information is not intended as a substitute for professional medical care. Always follow your healthcare professional's instructions.        Adapting to Pregnancy: Second Trimester    Keep up the healthy habits you started in your first trimester. You might be a little more tired than normal. So plan your day wisely. Look at the tips below and choose the ones that suit your lifestyle.  If you have any questions, check with your healthcare provider.  If you work  If you can, adjust your work with your employer to fit your needs. Try these tips:  If you stand for long periods, find ways to do some tasks while sitting. Also, try to stand with 1 foot resting on a low stool or ledge. Shift your weight from foot to foot often. Wear low-heeled shoes.  If you sit, keep your knees level with your hips. Rest your feet on a firm surface. Sit tall with support for your low back.  If you work long hours, ask about adjusting your schedule. Try taking shorter breaks more often.  When you travel  The second trimester may be the best time for any travel. Talk to your healthcare provider about any special plans you may need to make. Always:  Wear a seat belt. Fasten the lap part under your belly. Wear the shoulder part also.  Take breaks often during long trips by car or plane. Move around to stretch your legs.  Drink plenty of fluids on flights. The air in plane cabins is very  dry.  Avoid hot climates or high altitudes if you are not used to them.  Avoid places where the food and water might make you sick.  Make sure you are up-to-date on all immunizations, including the flu vaccine. This is especially important when traveling overseas.  Taking time to relax  Find time to rest and relax at work or at home:  Take short time-outs daily. Do relaxation exercises.  Breathe deeply during stressful times.  Try not to take on too much. Plan tasks for times when you have the most energy.  Take naps when you can. Or just sit and relax.  After week 16, avoid lying on your back for more than a few minutes. Instead, lie on your side. Switch sides often.  Continuing as lovers  Unless your healthcare provider tells you otherwise, there is no reason to stop having sex now. Blood supply increases to the pelvic area in the second trimester. Because of this, sex might be more enjoyable. Try different positions and see what’s best. Also, talk to your partner about any changes in desire. Spotting may happen after sex. Be sure to let your healthcare provider know if there is heavy bleeding.  Keeping your environment safe  You can still clean house and use scented products. Just take some simple precautions:  Wear gloves when using cleaning fluids.  Open windows to let in fresh air. Use a fan if you paint.  Avoid secondhand smoke.  Don’t breathe fumes from nail polish, hair spray, cleansers, or other chemicals.  [employer] last reviewed this educational content on 1/1/2018  © 4981-6295 [employer]. [address] [place] [address], [place] [id number]. All rights reserved. This information is not intended as a substitute for professional medical care. Always follow your healthcare professional's instructions.        Pregnancy: Your Second Trimester Changes  Each day, you and your baby are changing and growing together. Here’s a quick look at what’s happening to both of you.  How you are changing  Even when you  don’t notice it, your body is adapting to meet the needs of your growing baby. The changes in your body might also affect your moods.  Your body  Your uterus expands as baby grows. As the weeks go by, you will feel more pressure on your bladder, stomach, and other organs. You may notice some skin color changes on your forehead, nose, or cheeks. Freckles may darken, and moles may grow. You may notice a darker line on your abdomen between your belly button and pubic bone in the midline.  Your moods  The second trimester is often easier than the first. Still, be prepared for mood swings. These are due to the increase in hormones (chemicals that affect the way organs work) produced by your body. These mood swings are a normal part of pregnancy.  How your baby is growing          Month 4  Baby’s heartbeat may be heard with a Doppler (hand-held ultrasound device) by 9 to 10 weeks. Eyebrows, eyelashes, and fingernails begin to form.  Month 5  You may feel your baby move. After a growth spurt, your baby nears 10 inches. Month 6  Baby’s fingerprints have formed. Your baby weighs about 1 to 2 pounds and is about 12 inches long.   [employer] last reviewed this educational content on 1/1/2018  © 0906-9485 [employer]. [address] [place] [address], [place] [id number]. All rights reserved. This information is not intended as a substitute for professional medical care. Always follow your healthcare professional's instructions.     Pregnancy: Body Changes  From conception (fertilization) until after the birth of your child, you and your baby will change every day. To help you understand what is happening, we’ve outlined how pregnancy begins and some of the changes you may notice.    Your changing body  Pregnancy affects almost every part of your body. You may notice some of the following physical and emotional changes:  Your uterus expands outward and upward as your baby grows. You may feel pressure on your bladder, stomach,  and other organs.  You may notice skin color changes on your forehead, nose, and cheeks. A dark line may form from your bellybutton down to your pubic area. The skin color around your nipples and thighs may also change.  Pink stretch marks may appear on your abdomen, breasts, or hips.  Your hair may seem thicker. You lose less hair during pregnancy.  You may feel fine 1 day and weepy the next. This is caused by changes in your body, like increased hormones. These are chemicals that affect the function of certain organs and also your moods.  You may experience constipation, hemorrhoids, and/or heartburn.  You may experience mild shortness of breath.  Your legs may cramp.  You may have nausea and vomiting.  You may experience dizziness, extreme tiredness, and sleep problems.  You may experience temporary bladder control problems.  Nose bleeds and nasal stuffiness are common.     The fertilized egg travels down the fallopian tube and attaches to the uterus.    How pregnancy begins  Conception is the union of a sperm and an egg. When it happens, your baby’s genetic makeup is complete, even its sex. Fertilization takes place in the fallopian tube. The fertilized egg then travels down this tube to the uterus (womb). The egg attaches to the lining of the uterus about a week after conception. There it grows and is nourished.  [employer] last reviewed this educational content on 1/1/2018  © 6141-0249 [employer]. [address] [place] [address], [place] [address]. All rights reserved. This information is not intended as a substitute for professional medical care. Always follow your healthcare professional's instructions.        Pregnancy: Common Questions  There are plenty of myths and “old wives’ tales” surrounding pregnancy. You may need help  fact from fiction. On this sheet, you’ll find answers to a few common questions. If you have other questions, talk with your healthcare provider.     Will working harm  my baby?  In most cases, working throughout your pregnancy is not harmful at all. There may be concerns if the job involves dangerous machinery or chemicals, lifting, or standing for very long periods of time. Talk to your healthcare provider and employer about your particular job and pregnancy.   Is it safe to have sexual relations during pregnancy?  Yes, unless you are specifically instructed not to by your healthcare provider.  Why can’t I change the cat litter box?  Cats carry a disease called toxoplasmosis. In adult humans, it shows up as a mild infection of the blood and organs. If you are infected during pregnancy, the baby’s brain and eyes could be damaged. To be safe, have someone else change the litter. If you must handle it, wear a paper mask over your nose and mouth. Also, wear gloves and wash your hands afterward.   Which medicines are safe?  No prescription or over-the-counter medicine is safe for everyone all of the time. But sometimes medicines are needed.  Be sure your healthcare provider knows you are pregnant. Then use only the medicines he or she advises you to take.   Is it true that I can overheat my baby?  Yes. To avoid making your baby too warm:  Don’t sit in a Jacuzzi. A long, warm bath is fine, but not in water over 100°F (37.7°C).  Exercise less intensely if you feel fatigued. Base your workout on how you feel, not your heart rate. Heart rates aren’t a good way to measure effort during pregnancy.  Can I lift and carry safely?  Yes, if your healthcare provider doesn’t tell you otherwise. Learn to lift and carry safely to avoid injury and reduce back pain during pregnancy. To protect your back:   Bend at the knees to bring the load nearer.  Get a good . Test the weight of the load.  Tighten your belly. Exhale as you lift.  Lift with your legs, not with your back.  Carry the load close to your body.  Hold the load so you can see where you are going.  What if I get sick?  Most women get  sick at least once during pregnancy. Talk with your healthcare provider if you do. Most likely it will not affect your pregnancy. Get plenty of rest and fluids, and eat what you can. Talk to your healthcare provider before taking any medicines.   [employer] last reviewed this educational content on 10/1/2017  © 0894-1956 [employer]. [address] [place] [address], [place] [address]. All rights reserved. This information is not intended as a substitute for professional medical care. Always follow your healthcare professional's instructions.        Pregnancy: More Common Questions  On this sheet, you’ll find answers to some common questions about pregnancy. If you have other questions, talk with your healthcare provider.    Will traveling be too stressful?  Not if you set the pace. When you plan a trip, allow time to stop and rest. You may even want to postpone travel until the second trimester, when your body is more adjusted to pregnancy. Don’t wait as long as the third trimester, because of the possibility of going into early labor. Travel to a location where healthcare facilities are close by. You may want to take a copy of your records with you in case you need medical care when you are traveling.  Can I still be a vegetarian?  Yes. Be sure to consult a registered dietitian. And be sure to get enough of the following:  Protein. Eat eggs and milk if you’re an ovo-lacto vegetarian. Eat vegetable proteins, like tofu and beans, if you’re a vegan.  Calcium. If you don’t eat dairy, try soy milk, soy cheese fortified with calcium, and orange juice fortified with calcium.  Vitamin B12. You may need to take a supplement that includes folic acid.  Vitamin D. If you don’t drink milk, ask about taking a supplement.  Iron. Your healthcare provider may recommend a supplement.  Can I paint my nails?  Yes. Nail polish is not thought to be dangerous during pregnancy. Just be careful about breathing the fumes. Keep windows  open or use a fan. However, long-term exposure to the solvents used to remove nail polish may not be safe. If you work in a nail salon, talk with your healthcare provider about safety concerns.  Should I eat more if I exercise?  You will only need an extra 100 calories for each 30 minutes of mild exercise. But you’ll also need more fluids. Drink at least an extra 8 ounces of water.  Do I have to stop jogging?  You can jog as long as you are comfortable. Many women find the impact or bounce of a jog doesn’t feel good. Some switch to brisk walking as their pregnancy advances.  What should I limit or avoid eating or drinking?  Don't drink unpasteurized milk products or juices.  Don't eat raw or undercooked meat, poultry, fish, or eggs.   Don't eat prepared meats, like hot dogs or deli meat, unless served steaming hot.  Don't drink alcohol.  Limit caffeine unless your healthcare provider tells you otherwise.    Can I lift weights?  If you have been lifting weights, there is no need to stop. Instead, keep the weights light and in control. Never hold your breath. If you haven’t been lifting weights, don’t start now.  [employer] last reviewed this educational content on 10/1/2017  © 5524-5061 [employer]. [address] [place] [address], [place] [address]. All rights reserved. This information is not intended as a substitute for professional medical care. Always follow your healthcare professional's instructions.

## 2024-07-05 NOTE — PATIENT INSTRUCTIONS
Kick Counts  It’s normal to worry about your baby’s health. One way you can know your baby’s doing well is to record the baby’s movements once a day. This is called a kick count.   Remember to take your kick count records to all your appointments with your healthcare provider.  How to count kicks    Time how long it takes you to feel 10 kicks, flutters, swishes, or rolls. Ideally, you want to feel at least 10 movements in 2 hours. You will likely feel 10 movements in less time than that.  Starting at 28 weeks, count your baby's movements daily. Follow your healthcare provider's instructions for kick counting. Here are tips for counting kicks:  Choose a time when the baby is active, such as after a meal.   Sit comfortably or lie on your side.   The first time the baby moves, write down the time.   Count each movement until the baby has moved  10 times. This can take from 20 minutes to 2 hours.   If you haven't felt 10 kicks by the end of the second hour, wait a few hours. Then try again.  Try to do it at the same time each day.  When to call your healthcare provider  Call your healthcare provider  right away if:  You do a couple sets of kick counts during the day and your baby moves fewer than 10 times in 2 hours.  Your baby moves much less often than on the days before.  You haven't felt your baby move all day.  [employer] last reviewed this educational content on 2020    © 8357-8438 [employer]. All rights reserved. This information is not intended as a substitute for professional medical care. Always follow your healthcare professional's instructions. Premature Labor    Premature labor ( labor) is when symptoms of labor occur before 37 weeks of pregnancy. (This is 3 weeks before your due date.) Premature labor can lead to premature delivery. This means giving birth to your baby early. Babies need at least 37 weeks of pregnancy for all the organs to develop normally. The earlier the  delivery, the greater the risks to the baby.  In most cases, the cause of premature labor is unknown. But certain factors may make the problem more likely. These include:  History of premature labor with other pregnancies  Smoking  Alcohol or substance abuse  Low pre-pregnancy weight or weight gain during pregnancy  Short time period between pregnancies  Being pregnant with twins, triplets, or more  History of certain types of surgery on the cervix or uterus  Having a short cervix  Certain infections  There are a number of other risk factors. Ask your healthcare provider to help you understand the risk factors specific to your case. Then find out what you can do to control or reduce them.  Contractions are one of the main signs of premature labor. A contraction is different from cramping. It may feel painful and the belly (abdomen) may get hard. It can last from a few seconds to a few minutes. Some women may feel only a sense of pressure in the belly, thighs, rectum, or vagina. Some may feel only the hardening of the uterus without pain or pressure. Or there may be a constant pain in the lower back, which spreads forward toward the belly.Premature labor is often treated with medicines. A hospital stay may be needed. If labor doesn't progress and you and your baby are both healthy, you may be discharged to continue care at home.  Home care  Ask your provider any questions you have. Be certain you understand how to care for yourself at home. Also follow all recommendations given by your healthcare providers.  Learn the signs of premature labor. Watch for these signs when you get home.  Limit or restrict activities as advised. This may include stopping certain physical activities and cutting back hours at work.  Avoid doing strenuous work as directed by your provider. Ask family and friends for help with tasks and support at home, if needed.  Don’t smoke, drink alcohol, or use other harmful substances.  Take steps to  reduce stress.  Report any unusual symptoms to your provider.    Follow-up care  Follow up with your healthcare provider, or as directed. Weekly visits with your provider may be needed.  When to seek medical advice  Call your healthcare provider right away if any of these occur:  Regular or frequent contractions, whether they are painful or not  Pressure in the pelvis  Pressure in the lower belly or mild cramping in your belly with or without diarrhea  Constant low, dull backache  Gush or slow leaking of water from your vagina  Change in vaginal discharge (watery, mucus, or bloody)  Any vaginal bleeding  Decreased movement of your baby  [employer] last reviewed this educational content on 2018 [employer]. All rights reserved. This information is not intended as a substitute for professional medical care. Always follow your healthcare professional's instructions.     Understanding Preeclampsia  Preeclampsia is a condition that can happen in pregnancy. It includes high blood pressure (hypertension), swelling, and signs of organ problems. It can show up around week 20 of pregnancy. It often goes away by 12 weeks after you give birth. It can lead to serious health risks for you and your baby. During your pregnancy, your healthcare provider will watch your blood pressure.      Your blood pressure will be monitored regularly throughout your pregnancy to help check for preeclampsia.     Dangers of preeclampsia   If not treated, preeclampsia can cause problems for you and your baby. The placenta is the organ that nourishes your baby. It may tear away from the wall of the uterus. This can put the baby at risk for health problems (fetal distress). It can put the baby at risk for  birth. Preeclampsia can also cause these health problems in you:   Kidney failure or other organ damage  Seizures  Stroke  Who’s at risk for preeclampsia?   No one knows what causes preeclampsia. It can happen  in any pregnant person. But there are things that increase your risk. You may need to take a daily low dose of aspirin if you are at risk for preeclampsia.   You’re at higher risk for preeclampsia if you have any of these:  Diabetes  High blood pressure  Obesity  Kidney disease  Autoimmune disease such as lupus  A family history of preeclampsia  You’re at higher risk if any of these apply to you:  This is your first pregnancy  You are having twins or more  You’re under age 18 or over age 40  You used in vitro fertilization  You are Black  And you’re at higher risk if you had any of these in a past pregnancy:   Preeclampsia  Intrauterine growth retardation (IUGR)   birth  Placental abruption  Fetal death  Symptoms  A common symptom of preeclampsia is high blood pressure. Other symptoms may include:   Fast weight gain  Protein in your urine  Headache  Belly (abdominal) pain on your right side  Vision problems such as flashes or spots  Swelling (edema) in your face or hands (this often happens near the end of a normal pregnancy)  Tests you may have   Your healthcare provider will want to check your blood pressure. This will need to be done often in your pregnancy. If your blood pressure is high, you may have these tests:   Urine tests to look for protein  Blood tests to confirm preeclampsia  Fetal monitoring to make sure that your baby is healthy  Treating preeclampsia   Preeclampsia almost always ends soon after you give birth. Until then, your healthcare provider can help you manage it.   If your symptoms are mild, you may to:     Limit your activity  Rest in bed  Not do heavy lifting    If your symptoms are severe, you will stay in the hospital. Treatment here may include:   Limits to your activity. This is to help control your blood pressure. You should not lift anything heavy. You will need to spend 8 hours a day lying down with your feet up.  Magnesium IV (intravenous) drip. This is done during labor. It's  to prevent seizures  Induced labor or  section. Birth of the baby is considered the cure for preeclampsia.  When to call your healthcare provider   Call your healthcare provider if your symptoms start quickly or are severe. This includes swelling, weight gain, or other symptoms. Some cases of preeclampsia are more severe than others. Your symptoms also may change or get worse as you get closer to your due date.   Once you give birth   In most cases, preeclampsia goes away on its own soon after you give birth. This is often by the 12th week after you deliver. Within days after you give birth, your blood pressure, swelling, and other symptoms should get better. But for some people, problems from preeclampsia can continue after birth.   Postpartum preeclampsia   Preeclampsia that starts after birth is rare. There are 2 types:   Postpartum preeclampsia. This may start in the first 48 hours after birth.  Late-onset preeclampsia. This starts more than 48 hours after birth.  Both of these types are rare. But call your healthcare provider right away if you have symptoms of preeclampsia after you give birth.   [employer] last reviewed this educational content on 10/1/2021    © 7376-7074 [employer]. All rights reserved. This information is not intended as a substitute for professional medical care. Always follow your healthcare professional's instructions.

## 2024-07-05 NOTE — PROGRESS NOTES
Here for TEODORA visit.      Patient's last menstrual period was 2024 (exact date). 10/15/2024, by Last Menstrual Period 25w3d     Baby active. Denies contractions, LOF or bleeding    Has some back pain. Seeing a chiropractor. Doing exercises learned from PT from last pregnancy. Wearing support belt.    Labs: CBC, 1hr, ABO Rh & AB screen, TSH  ordered. To have drawn before next visit. Will need Rhogam nv. FOB Rh +/   Ultrasound: Order for growth ultrasound placed 2nd to hypothyroid. To schedule        ICD-10-CM    1. Prenatal care in second trimester ([facility])  Z34.92           Fetal kick counts, warning signs and signs PTL reviewed.

## 2024-08-01 NOTE — PATIENT INSTRUCTIONS
If You Are Rh Negative - Routine Administration    If you’re Rh negative, ask your health care provider about getting treated with RhoGam or Rhophylac. Even if you miscarry or don’t deliver the baby, you will still need treatment. The health of any baby you have in the future depends on it.       When are you treated?   If your blood has not formed Rh antibodies, you’ll be treated during week 28 of your pregnancy. You also may be treated any time there’s a chance that fetal blood has mixed with yours. For example, this might be after an amniocentesis, a prenatal test; or if you have vaginal bleeding earlier than 28 weeks. Treatment is an injection of a medicine called RhoGam or Rhophylac. RhoGam or Rhophylac prevents Rh antibodies from forming. It won’t harm you or the fetus. After you give birth, your baby’s blood will be tested. If it’s Rh positive, you’ll be given RhoGam or Rhophylac again within 3 days. If it’s Rh negative, you won’t need RhoGam or Rhophylac until your next pregnancy.     Preventing future problems   Your chance of forming Rh antibodies increases with each pregnancy. This is true even for an ectopic pregnancy (the fertilized egg is outside the uterus) and pregnancies that end in miscarriage or . In these cases, you will most likely receive a RhoGam or Rhophylac injection. This is because your body can make Rh antibodies even if you don’t deliver a baby. Rh antibodies can cause problems in future pregnancies such as fetal anemia (low iron in the blood), miscarriage, stillbirth, or a serious illness in the baby that is called hemolytic disease of the .  Fortunately, Rh sensitization is very rare because women who are Rh negative can get a shot that stops their body from making antibodies to Rh positive blood.     You can expect the following:     Blood test to check for blood type and Rh factor at an [facility]  RhoGam or Rhophylac injection in your provider’s office as  directed      Location, date and time:  8/1/24 Midwifery 3:22 pm

## 2024-08-01 NOTE — PROGRESS NOTES
Feeling well. Endorses regular fetal movement. Denies contractions, LOF, vaginal bleeding.     Has a lot of back pain. Seeing chiropractor. Plans to see PT again. She is concerned about working until the end of pregnancy if pain continues or worsens. She spoke with her manager who said she could submit paperwork for intermittent leave. She will see how the pain is going forward and consider this.    Rhogam given today    Plan:  TEODORA 2 weeks    Reviewed third trimester warning signs and when to call.

## 2024-08-15 NOTE — PROGRESS NOTES
Feeling well. Endorses regular fetal movement. Denies contractions, LOF, vaginal bleeding.       Plan:  TEODORA 2 weeks    Reviewed third trimester warning signs and when to call.

## 2024-09-05 NOTE — PROGRESS NOTES
Patient unable to work due to back pain. 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 Has been in PT for quite some time -  Has FMLA forms today. Discussed TENS unit. Reviewed danger signs.

## 2024-09-05 NOTE — TELEPHONE ENCOUNTER
Received FMLA forms from patient in office for her maternity leave as well as spouse's paternity leave. ROIs were signed and NO payments were collected. Both sets of forms were sent to the forms department for completion.

## 2024-09-12 NOTE — TELEPHONE ENCOUNTER
Mary,    Patient is seeking to take Family Medical Leave Act early due to back pain. Patient seeking to begin leave 09/18/24-her maternity leave. Informed patient taking Family Medical Leave Act early will impact her maternity leave time. Patient verbalized understanding. Do you support the patient's request?    ThanksMeghan

## 2024-09-12 NOTE — TELEPHONE ENCOUNTER
Patient called forms dept. Patient wanted to confirm if we had received her Family Medical Leave Act forms for herself and her spouse. Informed patient we did received them on 09/05/24 and we currently have a 10 business day turn around time. Patient was a little concern since she will start her leave early. Patient states she will start her leave 09/18/24-maternity leave due to back pain. Informed patient we will need to task provider for approval. Informed patient we will try our best to get forms completed soon. Informed patient taking Family Medical Leave Act early will cut into her maternity leave, patient verbalized understanding. Confirmed if both forms to be sent to [facility], per patient yes she and her spouse work in the same place. Patient would like a copy of her and spouses forms faxed and uploaded to [employer].  Spouse seeking paternity leave 1-12 wks.     Family Medical Leave Act for patient and spouse received in forms dept. Logged for processing. Valid Release of Information with forms.

## 2024-09-16 NOTE — TELEPHONE ENCOUNTER
Mary,    Please sign off on form if you agree to: Family Medical Leave Act for Maternity leave/back pain and Family Medical Leave Act for spouse paternity leave.    -Signature page will be the first page scanned  -From your Inbasket, Highlight the patient and click Chart   -Double click the 09/05/24 Forms Completion telephone encounter  -Scroll down to the Media section   -Click the blue Hyperlink: Family Medical Leave Act SAMANTHA MURO 09/16/24 AND Family Medical Leave Act (SPOUSE) CANDACE 09/16/24  -Click Acknowledge located in the top right ribbon/menu   -Drag the mouse into the blank space of the document and a + sign will appear. Left click to   electronically sign the document.  -Once signed, simply exit out of the screen and you signature will be saved.     Thank you,    Meghan

## 2024-09-19 NOTE — PROGRESS NOTES
Feeling well. Endorses regular fetal movement. Denies contractions, LOF, vaginal bleeding.     She has stopped working as of Sunday due to pelvic pain    GBS collected. Baby cephalic by leopolds  Plan:  TEODORA 1 weeks    Reviewed third trimester warning signs and when to call.

## 2024-09-20 NOTE — TELEPHONE ENCOUNTER
From: Nohemy Pierre  To: Mary Frausto  Sent: 9/20/2024 11:17 AM CDT  Subject: Hemorrhoid solution?     Carter Hernandez,    In my appointment with you last week you suggest trying prep h for my hemorrhoid but unfortunately it is getting bigger and much more painful. Do you ever prescribe a steroid or any other suggestion? I’ve tried pretty much all remedies on [employer].  Thanks!  Nohemy

## 2024-09-26 NOTE — PROGRESS NOTES
Feeling well. Endorses regular fetal movement. Denies contractions, LOF, vaginal bleeding.     Birth Plan Reviewed  Support:   Labor pain management: natural; nitrous; tub  Vitamin K: declines  Eye ointment: declines  Placenta: desires to take  Circumcision: no  Peds: Dr. García  Birth control: NFP  Feeding method: breastfeed  Postpartum support: family  Previous birth: gestational hypertension    Plan:    TEODORA 1 weeks    Reviewed third trimester warning signs and labor signs and when to call.

## 2024-10-02 NOTE — H&P
[facility]  part of [facility]    History & Physical    Nohemy Pierre Patient Status:  Inpatient    1990 MRN [id number]   Location [facility] Attending Anna Ace CNM   Hosp Day # 0 Admitting Anna Cohen MD     Date of Admission:  10/2/2024      HPI:   Nohemy Pierre is 34 year old and  with current gestational age of 38w1d and estimated due date of 10/15/2024, by Last Menstrual Period consistent with 7 week ultrasound    oNhemy is being admitted for labor management.    Her current obstetrical history is significant for Rh Negative, hypothyroid.    Patient reports contractions since overnight, getting strong around 6 am. Denies LOF Or bleeding  .     Fetal Movement: normal.     History   Obstetric History:   OB History    Para Term  AB Living   3 1 1 0 1 1   SAB IAB Ectopic Multiple Live Births   1 0 0 0 1      # Outcome Date GA Lbr Nickolas/2nd Weight Sex Type Anes PTL Lv   3 Current            2 Term 23 37w4d / 01:32 6 lb 5.9 oz (2.89 kg) F NORMAL SPONT EPI, Local N NOAM      Complications: Decreased FHR variability, Variable decelerations, PIH   1 2022             Past Medical History:   Past Medical History:    Acute cholecystitis    Calculus of bile duct with acute cholecystitis and obstruction    Chicken pox    Childhood    Decorative tattoo    Dysmenorrhea    Gestational hypertension, third trimester (HCC)    Hypokalemia    Hypothyroid     Past Social History:   Past Surgical History:   Procedure Laterality Date    Cholecystectomy       Family History:   Family History   Problem Relation Age of Onset    Hypertension Father     Lipids Mother     Other (Other) Mother         Hypothyroid    Cancer Brother         Leukemia    Stroke Maternal Grandmother     Other (Other) Maternal Grandfather         Alzheimer's    Cancer Paternal Grandfather         Prostate     Social History:   Social History     Tobacco  Use    Smoking status: Never     Passive exposure: Never    Smokeless tobacco: Never   Substance Use Topics    Alcohol use: Not Currently     Comment: socially        Allergies/Medications:   Allergies:   No Known Allergies  Medications:  Facility-Administered Medications Prior to Admission   Medication Dose Route Frequency Provider Last Rate Last Admin    Rho D immune globulin (RhoGAM) IM injection 300 mcg  300 mcg Intramuscular Once          Medications Prior to Admission   Medication Sig Dispense Refill Last Dose    levothyroxine 88 MCG Oral Tab    10/2/2024    ARMOUR THYROID 90 MG Oral Tab Take 30 mg by mouth daily. 2 tabs (180 mg) Monday, Wednesday, Friday  3 tabs (270 mg) Tuesday, Thursday, Saturday, Froilan   10/2/2024    Cholecalciferol (VITAMIN D-3 OR) Take 5,000 Units by mouth at bedtime.   10/1/2024    Ascorbic Acid (VITAMIN C OR) Take 500 mg by mouth at bedtime.   10/1/2024    prenatal vitamin with DHA 27-0.8-228 MG Oral Cap Take 1 capsule by mouth at bedtime.   10/1/2024    hydrocortisone 2.5 % External Cream Place 1 Application rectally 2 (two) times daily for 14 days. 28 g 0     NP THYROID 15 MG Oral Tab Take 3 tablets (45 mg total) by mouth daily.       aspirin (ASPIRIN 81) 81 MG Oral Chew Tab Take 1.5 tablets by mouth daily 90 tablet 2     Ferrous Gluconate (IRON 27 OR) Take 1 tablet by mouth at bedtime.       Omega-3 1000 MG Oral Cap Take 1 capsule by mouth at bedtime.       CALCIUM CARBONATE OR Take 1,000 mg by mouth at bedtime. (Patient not taking: Reported on 9/26/2024)       Cyanocobalamin (VITAMIN B 12 OR) Take 5,000 mcg by mouth at bedtime.          Review of Systems:   Constitutional: denies fever, aches, chills  HEENT: denies visual changes  Skin: denies any unusual skin lesions or itching  Lungs: denies shortness of breath  Cardiovascular: denies chest pain  GI: denies abdominal pain,denies heartburn, denies constipation or diarrhea  : denies dysuria, pelvic pain, vaginal discharge or  bleeding  Musculoskeletal: denies back or joint pain  Neuro denies headaches or dizziness  Psych: denies depression or anxiety    Physical Exam:   Temp:  [98.3 °F (36.8 °C)] 98.3 °F (36.8 °C)  Pulse:  [69] 69  Resp:  [16] 16  BP: (121)/(74) 121/74    Constitutional: alert, appears stated age, cooperative, and uncomfortable and breathing with contractions  Skin: no lesions or erythema  Respiratory: clear, unlabored  Cardiac: regular rate and rhythm   Abdomen: soft, non-tender, EFW 7 1/2-8#, presentation cephalic, uterine contractions q2-4 minutes  Fetal Surveillance:  135 BPM; Fetal heart variability: moderate  Fetal Heart Rate decelerations: none  Fetal Heart Rate accelerations: yes  Pelvis: Gynecoid  External Genitalia:  No lesions  Sterile vaginal exam: deferred  7/80/-2 cephalic per triage RN  Extremities: extremities normal, atraumatic, no cyanosis or edema  Musculoskeletal: steady gait  Psych:  Appropriate affect and speech    Results:     Prenatal Results       Initial       Test Value Reference Range Date Time    Blood Type (ABO Group)  O   07/31/24 1050    Rh Factor  Negative   07/31/24 1050    Antibody Screen (Required questions in OE to answer)  Negative   03/27/24 1736    HCT  41.1 % 35.0 - 48.0 03/27/24 1736    HGB  14.2 g/dL 12.0 - 16.0 03/27/24 1736    MCV  84.7 fL 80.0 - 100.0 03/27/24 1736    Platelets  293.0 10(3)uL 150.0 - 450.0 03/27/24 1736    Rubella  Positive  Positive 03/27/24 1736    TREP  Nonreactive  Nonreactive  03/27/24 1736    RPR ([facility])        Urine Culture  No Growth at 18-24 hrs.   03/27/24 1736    Hepatitis B  Nonreactive  Nonreactive  03/27/24 1736    HIV Combo  Non-Reactive  Non-Reactive 03/27/24 1736    HCV  Nonreactive  Nonreactive  03/27/24 1736              Optional Initial Labs       Test Value Reference Range Date Time    TSH  3.350 mIU/mL 0.550 - 4.780 03/27/24 1736       2.260 mIU/mL 0.550 - 4.780 02/09/24 1351    Pap Smear  Cytology Results: Negative for intraepithelial  lesion or malignancy.   23 1349    HPV  Negative  Negative 23 1349    GC DNA        Chlamydia DNA        GTT 1 Hr        Glucose Fasting        Glucose 1 Hr        Glucose 2 Hr        Glucose 3 Hr        HgB A1c  5.1 % <5.7 24 1736    Vitamin D                  8-20 Weeks       Test Value Reference Range Date Time    1st Trimester Aneuploidy Risk Assessment        Quad - Down Screen Risk Estimate - prior to 18        Quad - Down Maternal Age Risk - prior to 18        Quad - Trisomy 18 screen Risk Estimate - prior to 18        AFP Spina Bifida (Required questions in OE to answer )        QUAD/AFP - Interpretation        Free Fetal DNA         Genetic testing        Genetic testing        Genetic testing        GC DNA        Chlamydia DNA                  24-28 Weeks       Test Value Reference Range Date Time    HCT  40.1 % 35.0 - 48.0 24 1050    HGB  13.8 g/dL 12.0 - 16.0 24 1050    Platelets  230.0 10(3)uL 150.0 - 450.0 24 1050    GTT 1 Hr  91 mg/dL See comment 24 1050    Glucose Fasting        Glucose 1 Hr        Glucose 2 Hr        Glucose 3 Hr        TSH  1.202 mIU/mL 0.550 - 4.780 24 1050     Profile  Negative   24 1050    Urine Culture        GC DNA        Chlamydia DNA                  35-37 Weeks       Test Value Reference Range Date Time    HCT  43.0 % 35.0 - 48.0 10/02/24 1046    HGB  15.3 g/dL 12.0 - 16.0 10/02/24 1046    Platelets  233.0 10(3)uL 150.0 - 450.0 10/02/24 1046    TREP        RPR ([facility])        Genital Group B Culture  Negative  Negative 24 1240    TSH  0.800 mIU/mL 0.550 - 4.780 24 1248    Urine Culture        HIV Combo        GC DNA        Chlamydia DNA        Rapid HIV                  Optional Labs       Test Value Reference Range Date Time    HgB A1c  5.1 % <5.7 24 1736    HGB Electrophoresis  (See Report)    24 1736    Varicella Zoster  1,646.00  >165.00 24 1736    Cystic  Fibrosis-Old         Cystic Fibrosis[32] (Required questions in OE to answer)        Cystic Fibrosis[165] (Required questions in OE to answer)        Cystic Fibrosis[165] (Required questions in OE to answer)        Cystic Fibrosis[165] (Required questions in OE to answer)        Sickle Cell        24Hr Urine Protein  174.6 mg/24 hr <149.1 10/19/22 1050    24Hr Urine Creatinine        Parvo B19 IgM        Parvo B19 IgG                  Legend    ^: Historical                            Reviewed all prenatal ultrasounds    Admit labs pending    Assessment/Plan:   Nohemy is 34 year old and  with current gestational age of 38w1d and estimated due date of 10/15/2024, by Last Menstrual Period consistent with 7 week ultrasound    Active phase labor.  Category 1 FHR tracing  Obstetrical history significant for Rh Negative, hypothyroid.    Admission problem(s):    Normal labor (HCC)  -Admit, routine labs, SL  -Intermittent EFM  -Anticipate       Hypothyroid  -Managed by endo      Rh negative state in antepartum period (HCC)  -Received  Rhogam. Plan Rhogam PP if baby Rh +            Risks, benefits, alternatives and possible complications have been discussed in detail with the patient.   Pre-admission, admission, and post admission procedures and expectations were discussed in detail.    All questions answered, all appropriate consents will be signed at the Hospital. Admission is planned for today.   Expectant management. and Anticipate vaginal delivery..    Anna Ace CNM  10/2/2024  11:35 AM

## 2024-10-02 NOTE — TELEPHONE ENCOUNTER
Pt Name and  verified.  Pt started having \"period cramping\" last night and they have been pretty consistent. In the last hour and a half, they are coming every 5 mins but the interval is different. Pt states that she also noticed a mucous pink discharge this morning that she had not seen before. +FM. Advised pt to go to [facility] Triage to monitor for active labor. Pt will have  take her, will wait for her mom to watch daughter and she is about 45 mins from hospital. Pt did sound winded on phone, described needing to stop what she was doing if a contraction came. Routing to on call midwife. FBC RN Triage informed.

## 2024-10-02 NOTE — L&D DELIVERY NOTE
[facility]  part of [facility]    Vaginal Delivery Note    Nohemy Pierre Patient Status:  Inpatient    1990 MRN [id number]   Location [facility] Attending Anna Ace CNM   Hosp Day # 0 PCP ANA PAULA LAKE MD     Delivery     Infant  Date of Delivery: 10/2/2024    Time of Delivery: 2:42 PM   Delivery Type: Normal spontaneous vaginal delivery     Infant Sex/Birthweight: female No birth weight on file.     Presentation Vertex [1]   Position   Occiput [1]  Anterior [1]     Apgars:  1 minute:                 5 minutes:                          10 minutes:      Placenta  Date/Time of Delivery: 10/2/2024  2:49 PM    Delivery: spontaneous  Placenta to Pathology: no  Cord Gases Submitted: no  Cord Blood Collection: yes  Cord Tissue Collection: no  Cord Complications: none  Sponge and Needle Counts:  Verified yes    Maternal Anesthesia:  nitrous    Episiotomy/Laceration Repair  Laceration: perineal 1st degree  Location: midline  Suture Size/Type: 3-0 Chromic  Anesthesia: 1% lidocaine    Delivery Complications  none    Neonatologist Present: no  Delivery Comment: Nohemy progressed to complete dilatation and +1 station prior to pushing successfully to viable baby girl. See Delivery Summary for time, APGARs, and weight. Fetal head presented in the OA position. Sweep for nuchal cord was performed and no nuchal cord identified. Anterior shoulder delivered spontaneously without traction. Baby vigorous at birth. To maternal abdomen for dry and stimulation then cord cut after pulsing ceased.  Spontaneous placenta by king mechanism, complete with 3 vessel cord. Prophylactic pitocin was not started per pt request and bleeding remained minimal. Hemostasis achieved by fundal massage. Vagina and perineum inspected and 1st degree perineal laceration noted and repaired in usual fashion. Mother and infant appeared in stable condition when midwife left the delivery room.  Sharps and 4x4 counts were correct. Skin to skin initiated.    Quantitative Blood Loss (mL)  100           Anna Ace CNM   10/2/2024  3:12 PM

## 2024-10-02 NOTE — PROGRESS NOTES
Pt is a 34 year old female admitted to TR5/TR5-A.     Chief Complaint   Patient presents with    R/o Labor     Contractions stronger and every 5 min since 0900      Pt is  38w1d intra-uterine pregnancy.  History obtained, consents signed. Oriented to room, staff, and plan of care.

## 2024-10-03 NOTE — LACTATION NOTE
This note was copied from a baby's chart.     10/03/24 0867   Evaluation Type   Evaluation Type Inpatient   Problems & Assessment   Problems Diagnosed or Identified Disorganized suck; feeding problem;Latch difficulty   Problems: comment/detail Aggresive suck, clicking   Infant Assessment Hunger cues present   Muscle tone Appropriate for GA   Feeding Assessment   Summary Current Feeding Breastfeeding exclusively   Breastfeeding Assessment Assisted with breastfeeding w/mother's permission;Sustained nutrititive latch w/audible swallows;Sustained nutritive latch using nipple shield;Deep latch achieved and observed   Breastfeeding lasted # of minutes 15   Breastfeeding Positions left breast;right breast;laid back;cross cradle   Latch 2   Audible Sucks/Swallows 2   Type of Nipple 2   Comfort (Breast/Nipple) 0   Hold (Positioning) 1   LATCH Score 7   Other (comment) Multiple attempts, latch very painful for mom and unable to improve with positioning. Nipple shield introduced with increased comfort achieved.   Equipment used   Equipment used Nipple Shield   Nipple shield size 20 mm

## 2024-10-03 NOTE — PROGRESS NOTES
[facility]  part of [facility]    OB/GYNE Progress Note      Nohemy Pierre Patient Status:  Inpatient    1990 MRN [id number]   Location [facility] 3SE Attending Anna Ace, CYNTHIA   Hosp Day # 1 PCP ANA PAULA LAKE MD        Assessment/Plan   Nohemy Pierre is a 34 year old , day 1 after a vaginal delivery.  Breastfeeding without difficulty   Discharge home anticipated tomorrow if BP's normalize and pending P/C results  Postpartum teaching completed including danger signs and when to call the CNM       (normal spontaneous vaginal delivery) ([facility])  Stable       Hypothyroid  Thyroid labs normal in pregnancy      Gestational hypertension ([facility])  Mildly elevated BP's postpartum  Will initiate Nifedipine 30XL qday starting now ()  BP's q4 hours   Asymptomatic   CMP & CBC WNL  P/C ratio WNL      Rh negative state in antepartum period ([facility])  Baby is Rh negative        Discussed with: nurse     patient and partner     Plan discussed with patient who verbalizes understanding and agreement.        Subjective   Currently she denies excessive bleeding or pain. No clots.   States baby is latching and sucking well.   Support at home: partner  Has car seat   Desires discharge as soon as possible    Review of Systems:  Constitutional: Denies   Genitourinary: Denies   Respiratory: Denies   Psychiatric: Denies         Objective   Vital signs in last 24 hours:  Temp:  [97.2 °F (36.2 °C)-98.3 °F (36.8 °C)] 98.2 °F (36.8 °C)  Pulse:  [69-93] 81  Resp:  [15-18] 16  BP: (121-156)/(73-95) 134/95    Input/Output:    Intake/Output Summary (Last 24 hours) at 10/3/2024 1043  Last data filed at 10/2/2024 2015  Gross per 24 hour   Intake --   Output 900 ml   Net -900 ml       Weight (Last 6):  Wt Readings from Last 6 Encounters:   10/02/24 174 lb (78.9 kg)   24 173 lb 12.8 oz (78.8 kg)   24 172 lb (78 kg)   08/15/24 164 lb (74.4 kg)   24 162 lb 6.4 oz (73.7 kg)    07/05/24 157 lb (71.2 kg)     Body mass index is 30.82 kg/m².     Exam:  Constitutional: Comfortable   Uterus: Fundus below umbilicus   Wound/Incision: Well-approximated, no swelling or edema, small lochia rubra, no clots   Respiratory: Unlabored respirations   Extremities: No edema   Psychiatric: Calm affect, appropriate     DVT Risk Score: Low risk        Results:     Lab Results   Component Value Date    TREPONEMALAB Nonreactive 10/02/2024    RAPIDHIVSCRN Nonreactive 10/02/2024    ABO O 10/02/2024    RH Negative 10/02/2024    WBC 13.7 (H) 10/03/2024    HGB 14.6 10/03/2024    HCT 41.3 10/03/2024    .0 10/03/2024    CREATSERUM 0.67 10/02/2024    BUN 11 10/02/2024     10/02/2024    K 3.8 10/02/2024     10/02/2024    CO2 23.0 10/02/2024     (H) 10/02/2024    CA 9.7 10/02/2024    ALB 4.0 10/02/2024    ALKPHO 223 (H) 10/02/2024    BILT 0.5 10/02/2024    TP 6.5 10/02/2024    AST 28 10/02/2024    ALT 15 10/02/2024    INR 0.91 04/14/2023    T4F 1.0 09/19/2024    TSH 0.800 09/19/2024    LIP 64 05/09/2023       Lab Results   Component Value Date    MG 2.1 05/10/2023    B12 922 (H) 12/15/2023    COLORUR Light-Yellow 05/09/2023    CLARITY Clear 05/09/2023    SPECGRAVITY 1.019 05/09/2023    PROUR Negative 05/09/2023    GLUUR Normal 05/09/2023    KETUR >150 (A) 05/09/2023    BILUR Negative 05/09/2023    BLOODURINE Negative 05/09/2023    NITRITE Negative 05/09/2023    UROBILINOGEN Normal 05/09/2023    LEUUR 75 (A) 05/09/2023    UASA Negative 04/11/2023       No results found.          Tabby Goetz CNM  10/3/2024  10:43 AM

## 2024-10-03 NOTE — LACTATION NOTE
10/03/24 0922   Problems identified   Problems identified Knowledge deficit;Nipple trauma;Nipple pain   Maternal history   Maternal history PIH;Hypothyroid   Breastfeeding goal   Breastfeeding goal To maintain breast milk feeding per patient goal   Maternal Assessment   Bilateral Breasts Soft;Symmetrical   Bilateral Nipples Slightly everted/short;Blister;Pink;Sore;Compression stripe   Prior breastfeeding experience (comment below) Multip;Successful;Problems, continued   Prior BF experience: comment Used nipple shield for 3 months then had lip and tongue corrections with improvement in latch. BF for 13 months.   Breastfeeding Assistance Breastfeeding assistance provided with permission;Breast exam provided with permission   Pain assessment   Pain, additional Pain location   Pain Location Nipples   Location/Comment Latch is 5/6, able to improve with nipple shield   Treatment of Sore Nipples Deeper latch techniques;Hydrogel dressings as directed;Lanolin   Guidelines for use of:   Equipment Nipple shield   Current use of pump: Declined - would like to avoid introducing breast pump. Discussed risks/benefits of nipple shield use, will trial nipple shield for next 1-2 days for healing.   Suggested use of pump Pump after nursing if a nipple shield is used

## 2024-10-03 NOTE — PLAN OF CARE

## 2024-10-04 NOTE — DISCHARGE SUMMARY
[facility]  part of [facility]    Discharge Summary    Nohemy Pierre Patient Status:  Inpatient    1990 MRN [id number]   Location [facility] 3SE Attending Anna Ace CNM   Hosp Day # 2       Delivering OB Clinician: Anna Ace CNM    EDC: Estimated Date of Delivery: 10/15/24    Gestational Age: 38w1d    Antepartum complications:   Patient Active Problem List   Diagnosis    Hypothyroid    Gestational hypertension ([facility])    History of abnormal cervical Pap smear    Rh negative state in antepartum period ([facility])    Back pain affecting pregnancy (HCC)    Pregnancy ([facility])    Normal labor ([facility])     (normal spontaneous vaginal delivery) ([facility])       Date of Delivery: 10/2/2024  Time of Delivery: 2:42 PM     Delivery Type: spontaneous vaginal delivery    Baby: Liveborn female   Information for the patient's :  Lee Pierre [[id number]]   7 lb 10.4 oz (3.47 kg)   Apgars:  1 minute: 8   5 minutes: 9 10 minutes:       Intrapartum Complications: gestational hypertension    Admit Date: 10/2/2024    Discharge Date: 10/4/2024    Hospital Course: No complications. Routine delivery and postpartum care    Discharged Condition: stable    Disposition: home    Plan:     Follow-up appointment on Monday for blood pressure check with RN and in 2 weeks with CYNTHIA Sellers CNM  10/4/2024  10:58 AM

## 2024-10-04 NOTE — LACTATION NOTE
This note was copied from a baby's chart.     10/04/24 8474   Evaluation Type   Evaluation Type Inpatient   Problems & Assessment   Problems Diagnosed or Identified Latch difficulty   Problems: comment/detail Aggresive suck, clicking   Infant Assessment Hunger cues present   Muscle tone Appropriate for GA   Feeding Assessment   Summary Current Feeding Breastfeeding exclusively   Breastfeeding Assessment Assisted with breastfeeding w/mother's permission;Deep latch achieved and observed;Calm and ready to breastfeed;Coordinated suck/swallow;Sustained nutrititive latch w/audible swallows   Breastfeeding Positions cross cradle;right breast;left breast   Latch 2   Audible Sucks/Swallows 2   Type of Nipple 2   Comfort (Breast/Nipple) 1   Hold (Positioning) 1   LATCH Score 8

## 2024-10-04 NOTE — PROGRESS NOTES
[facility]  part of [facility]    OB/GYNE Progress Note      Nohemy Pierre Patient Status:  Inpatient    1990 MRN [id number]   Location [facility] 3SE Attending Anna Ace, CYNTHIA   Hosp Day # 2 PCP ANA PAULA LAKE MD        Assessment/Plan   Nohemy Pierre is a 34 year old , day 2 after a vaginal delivery  Breastfeeding without difficulty   Discharge home today  Postpartum teaching completed including danger signs and when to call the CNM       (normal spontaneous vaginal delivery) (HCC)  Normal PPD#2      Hypothyroid  Stable      Gestational hypertension (HCC)  30mg Nifedipine initiated yesterday, continue  BP normal since starting nifedipine  Asymptomatic  Has home BP cuff. Will check BID   Reviewed warning signs and when to call  BP check in office on Monday        Rh negative state in antepartum period (HCC)  Rhogam as indicated        Discussed with: nurse     patient and partner     Plan discussed with patient who verbalizes understanding and agreement.        Subjective   Currently she denies excessive bleeding or pain. No clots. Denies SOB, chest pain, HA, dizziness. + void. + stool  Support at home: partner and family  Has car seat     Review of Systems:  Constitutional: Denies   Genitourinary: Denies   Respiratory: Denies   Psychiatric: Denies         Objective   Vital signs in last 24 hours:  Temp:  [97.7 °F (36.5 °C)-97.8 °F (36.6 °C)] 97.7 °F (36.5 °C)  Pulse:  [54-71] 54  Resp:  [14] 14  BP: (117-137)/(79-98) 127/85    Input/Output:  No intake or output data in the 24 hours ending 10/04/24 1052    Weight (Last 6):  Wt Readings from Last 6 Encounters:   10/02/24 174 lb (78.9 kg)   24 173 lb 12.8 oz (78.8 kg)   24 172 lb (78 kg)   08/15/24 164 lb (74.4 kg)   24 162 lb 6.4 oz (73.7 kg)   24 157 lb (71.2 kg)     Body mass index is 30.82 kg/m².     Exam:  Constitutional: Comfortable and bonding well with infant    Uterus: Fundus firm, below umbilicus   Wound/Incision: Well-approximated, no swelling or edema, small lochia rubra, no clots   Respiratory: Unlabored respirations   Extremities: No edema. No evidence of DVT on exam   Psychiatric: Calm affect, appropriate     DVT Risk Score: Low risk        Results:     Lab Results   Component Value Date    TREPONEMALAB Nonreactive 10/02/2024    RAPIDHIVSCRN Nonreactive 10/02/2024    ABO O 10/02/2024    RH Negative 10/02/2024    WBC 13.7 (H) 10/03/2024    HGB 14.6 10/03/2024    HCT 41.3 10/03/2024    .0 10/03/2024    CREATSERUM 0.67 10/02/2024    BUN 11 10/02/2024     10/02/2024    K 3.8 10/02/2024     10/02/2024    CO2 23.0 10/02/2024     (H) 10/02/2024    CA 9.7 10/02/2024    ALB 4.0 10/02/2024    ALKPHO 223 (H) 10/02/2024    BILT 0.5 10/02/2024    TP 6.5 10/02/2024    AST 28 10/02/2024    ALT 15 10/02/2024    INR 0.91 04/14/2023    T4F 1.0 09/19/2024    TSH 0.800 09/19/2024    LIP 64 05/09/2023       Lab Results   Component Value Date    MG 2.1 05/10/2023    B12 922 (H) 12/15/2023    COLORUR Light-Yellow 05/09/2023    CLARITY Clear 05/09/2023    SPECGRAVITY 1.019 05/09/2023    PROUR Negative 05/09/2023    GLUUR Normal 05/09/2023    KETUR >150 (A) 05/09/2023    BILUR Negative 05/09/2023    BLOODURINE Negative 05/09/2023    NITRITE Negative 05/09/2023    UROBILINOGEN Normal 05/09/2023    LEUUR 75 (A) 05/09/2023    UASA Negative 04/11/2023       No results found.          Yelena Kay CNM  10/4/2024  10:52 AM

## 2024-10-04 NOTE — TELEPHONE ENCOUNTER
LMYelena Mosquera CNM  P Em Ob/Gyne Midwifery Clinical Pool  This patient is being discharged today and needs BP check with RN on Monday. Thanks!

## 2024-10-04 NOTE — LACTATION NOTE
10/04/24 0820   Evaluation Type   Evaluation Type Inpatient   Problems identified   Problems identified Knowledge deficit;Nipple trauma;Nipple pain   Problems Identified Other Persistent nipple pain and shallow latch   Maternal history   Maternal history PIH;Hypothyroid   Breastfeeding goal   Breastfeeding goal To maintain breast milk feeding per patient goal   Maternal Assessment   Bilateral Breasts Soft;Symmetrical   Bilateral Nipples Colostrum easily expressed;Sore;Compression stripe;Everted   Prior breastfeeding experience (comment below) Multip   Prior BF experience: comment Used nipple shield for 3 months then had lip and tongue corrections with improvement in latch. BF for 13 months.   Breastfeeding Assistance Breastfeeding assistance provided with permission;Breast exam provided with permission;Hand expression provided with permission   Pain assessment   Pain, additional Pain location   Pain Location Nipples   Location/Comment nipple pain improves slightly with deeper latch.   Treatment of Sore Nipples Deeper latch techniques   Guidelines for use of:   Other (comment) Nohemy is having persistent nipple pain with latching and does not experience much relief with use of nipple shield. Has  last two feedings without the nipple shield with similar nipple pain. Observed mom latch infant slightly shallow and noted clicking. Coached in a cross-cradle position with deep latch techniques and noted infant was able to sustain a latch without clicking and with \"less pain\" reported by mom. Nipples were still slightly compressed at the base when infant unlatched. Similar latch noted on opposite side. Mom will followup with lactation next week for ongoign support to resolve nipple pain.

## 2024-10-04 NOTE — DISCHARGE PLANNING
Discharge order received from MD. Mom in stable condition. Discharge instructions given regarding pelvic rest, bleeding expectations, preeclampsia signs and symptoms, OB followup, incisional care, pain medication, and blood pressure medication. Patient verbalized understanding. ID band removed and verified against baby's. Mom escorted to vehicle with all belongings via wheelchair and baby secured in carseat.

## 2024-10-08 NOTE — LACTATION NOTE
This note was copied from a baby's chart.     10/08/24 1600   Evaluation Type   Evaluation Type Inpatient   Problems & Assessment   Problems Diagnosed or Identified Latch difficulty   Problems: comment/detail Nohemy is here with her six day old infant, Lupe, for ongoing support with latching and nipple pain. Mom  her first with difficulty due to tongue tie, pumping, and bottle feeding. Mom observes some tongue restricition with this infant, but states Lupe is significantly easier to breastfeed than her first and that mom would prefer to work on improving her latch as much as possible before seeking treatment for potential tongue restriction. Infant is currently exclusively  8-12x/day and is latching primarily with a nipple shield. Feedings are painful throughout and even worse with intial latch. Infant is voiding, stooling, and gaining weight appropriately.   Infant Assessment Hunger cues present;Skin color: pink or appropriate for ethnicity   Muscle tone Appropriate for GA   Feeding Assessment   Summary Current Feeding Adlib;Breastfeeding exclusively   Breastfeeding Assessment Assisted with breastfeeding w/mother's permission;Calm and ready to breastfeed;Coordinated suck/swallow;Deep latch achieved and observed;Tolerated feeding well;Sustained nutrititive latch w/audible swallows   Breastfeeding lasted # of minutes 15   Breastfeeding Positions cross cradle;right breast;left breast   Latch 2   Audible Sucks/Swallows 2   Type of Nipple 2   Comfort (Breast/Nipple) 1   Hold (Positioning) 1   LATCH Score 8   Other (comment) Observed infant latch slightly shallow with nipple shield and noted bruising \"dots\" at mom's nipple tip--likely from suction pressure through the nipple shield. Coached mom with relatching without the nipple shield using an exaggerated latch technique (\"flipple\") and observed mom relatch infant successfully using the technique well. Mom says intial pain is still intense, but  nipples are less sore throughout the feeding. Infant transferred 64ml on the left side in about 10 minutes and then appeared satiated. Mom attempted to relatch on opposite side with some success, but infant was sleepy and only transferred 6ml before unlatching. Noted areolar edema bilaterally. Reviewed reverse pressure softening techniques to relieve edema. Mom also complained of ductal pain to left side post latch which is possibly due to ongoing nipple pain from latching. Recommended mom use cool packs to decrease swelling and manage ductal pain. Hydrogels given to support nipple healing. Mom will followup with lactation in one week to assess for nipple healing and latch improvement.   Output   # Voids in 24 hours 6-8   # Stools in 24 hours 6   Pre/Post Weights   Pre-Weight Right Breast (g) 3432   Post-Weight Right Breast (g) 3438   ml of milk, RT Brst 6   Pre-Weight Left Breast (g) 3368   Post-Weight Left Breast (g) 3432   ml of milk, LT Brst 64   ml of milk, total 70

## 2024-10-08 NOTE — PATIENT INSTRUCTIONS
[facility]  Renetta Tj RN, BSN, IBCLC  [phone]      Birth Weight: 7lbs 10.4oz  Today's Naked Weight: 7lbs 6.8oz        Lupe transferred 70ml from breast today (6ml right, 64ml left). Great job!     FEEDING PLAN    Breastfeeding frequency: Continue breastfeeding on demand 8-12x/day. Make the best of 20-30 minutes (+/-) when feeding at breast, apply breast compressions and provide gentle stimulation as needed to encourage efficiency at breast.    Nipple pain: Continue using hydrogels and practicing the deep latching techniques we discussed (\"flipple\"--see the handout). Avoid using the nipple shield if possible.     Breast swelling (also known as \"edema\"): Use the reverse pressure softening technique we discussed for 1-2 minutes before latching. This will help push some of the fluid away from the nipple and areola, which will help make the nipple and areola more everted and softer and potentially easier for Lupe to latch onto.    Breast pain: Use cold compresses to help with ductal pain post latching and to help decrease inflammation.    Follow up: Return to the lactation clinic on Tuesday, October 15, 2024 at 1045 to receive ongoing support with latching and nipple pain.        ADDITIONAL INFORMATION:     Snuggle your baby in skin to skin contact between and during feedings whenever possible.    Massage your breasts before nursing or pumping to soften areola if needed.    Breastfeed with hunger cues: Most babies will breastfeed 8-12 times every 24 hours with some clustered breastfeeding, especially during growth spurts.     Positioning:   Baby facing mom with mouth at nipple level. Bring infant to the breast not the breast to the infant.  Make sure infant is fully turned towards you with head aligned with the shoulders for latch and arms hugging you.  Baby should approach breast reaching up with the chin lifted.  Chin is deep into the breast and nose is slightly away from  breast after latch.  Make small adjustments in position for your comfort and to help make space for the infant's nose if needed.    Deep Latching on:  Express drops of milk onto your baby’s lips to encourage latching if needed.  Aim your nipple to baby’s nose  Wait for a wide mouth and push your nipple in the mouth as you bring infant fully onto the breast.  Observe for mouth \"planted\" on the breast and for good jaw movement with suck.    Is baby taking enough breast milk?  Swallowing with most sucks (every 1-3 sucks) until satisfied at least 8-12 times every 24 hours.  Compressing the breast when your baby sucks can increase milk flow.  At least 6-8 wet diapers and at least 3-4 soft, yellow seedy stools every 24 hours. Use the breastfeeding journal to keep a record.   Weight gain of at least 5-7 ounces per week for the first 3 months after return to birth weight.    Hour of Age  Intake (mL/feed)  1st 24 hours 2-10  24-48 hours 5-15  48-72 hours 15-30  72-96 hours 30-60  Day 10: 2-3 ounces per feeding.  4 weeks: 3-4 ounces or more per feeding.      Remember the helpful website to improve your production that helps https://med.[employer].edu/newborns/professional-education/breastfeeding/maximizing-milk-production.html    Breastfeeding Journal:  Write down your baby’s feedings and diapers - if not meeting the guideline for number of diapers or feedings, call your baby’s doctor.      Follow up with your OB healthcare provider:  Call if a plugged duct or engorgement persists greater than 48 hours. Call if firm or reddened spots are present in breast with signs of fever, chills or flu like symptoms (possible breast infection/mastitis). Check your temperature during engorgement.      Care for nipples until healed:     Express drops of breast milk on nipples before and after nursing (unless nipple thrush is suspected or present).  Use a hydrogel type dressing on your nipples between feedings. (Soothies or Ameda Comfort Gel  pads)  Or, use Lanolin every time after breastfeeding. (Do not combine with use of gel pads)  If too sore to nurse on one or both breasts, pump one (or both) breast(s) to comfort every 2-3 hours. If nursing to contentment on one breast, this pumped milk can be stored for future use. If not nursing on either breast, feed baby your breast milk until able to return to breast.   Discuss use of all purpose nipple ointment with your OB doctor.   Call doctor if nipple has signs of infection: red/deep pink, drainage (pus), increased pain, fever.       Call your OB doctor with any signs of mastitis (breast infection)    Plugged area(s) are not soft within 24 hours.   Breast becomes firm, reddened, or painful.   Fever, chills, or flu-like symptoms. Check your temperature 3 times daily until a plugged duct or mastitis resolves.    If mastitis occurs:  Continue breastfeeding and/or pumping - your milk is not infected.   Continue above treatment for relieving plugged area(s)  Continue to take antibiotic as prescribed even though you may quickly feel better.   Contact your doctor is you are not feeling significantly better within 1-2 days of starting antibiotic, sooner if symptoms worsen.    Follow up with your OB healthcare provider:  Call if a plugged duct or engorgement persists greater than 48 hours. Call if firm or reddened spots are present in breast with signs of fever, chills or flu like symptoms (possible breast infection/mastitis). Check your temperature during engorgement.      [facility] has great support for our families even after discharge.  We have virtual or in-person support groups.  Visit our website for the most up-to-date info for our many different support groups. [email or web address]/[email or web address]       New Moms Support Groups  Our weekly New Mom Support Groups are for any new parents in our community. They are led by an experienced Mother/Baby nurse or IBCLC and usually  include a guest speaker on a topic of interest to new parents. These in-person groups also include Breastfeeding Support at each meeting. Bring your baby ( - 6 months) with you! Moms-to-be are also welcome! All mom's welcome even if its not your first.     MOM & BABY HOUR   Meets most  10:00 - 11:30 a.m.  Masks are not required, but be considerate of others and do not attend if mom or baby have had any symptoms of illness within the previous 24 hours. Breastfeeding support will be included at each session--just ask the leader any breastfeeding questions you may have. [facility] [place] [address] [place] Go inside the front door and to the right to the “Community Education Room”.    Mom's Line: [phone]  A phone line dedicated for women (or anyone worried about a women) who may be experiencing signs or symptoms of postpartum depression, anxiety, or overwhelmed with new baby. Call - answered by live trained mental health professionals, free and confidential, emotional support, referrals, in any language.    Nurturing Mom- A support group for new and expectant moms looking for support with the transition to parenthood as well as those experiencing symptoms of  anxiety and/or depression.  Please contact [email or web address] if you need directions or the link for the virtual meetings. Please contact [email or web address] if you plan to attend, but please be considerate of others and do not attend if mom or baby have had any symptoms of illness within the previous 24 hours.     La Leche League for breast feeding and parent support, Website: IIIus.org  and for the [place] group and other groups visit https://www.[employer].com/pg/[place]ak/events/.  to help find a group, all meetings are virtual.     Facebook groups-  for more support when home- [facility] --- you can find mom-to-mom advice and the list of speaker  topics for cradle talk program.     Helpful websites:    www.llli.org  [email or web address]  [email or web address]

## 2024-10-08 NOTE — LACTATION NOTE
10/08/24 1600   Evaluation Type   Evaluation Type Outpatient Initial   Problems identified   Problems identified Knowledge deficit;Nipple trauma;Nipple pain   Problems Identified Other Nohemy is here with her six day old infant, Lupe, for ongoing support with latching and nipple pain. Mom  her first with difficulty due to tongue tie, pumping, and bottle feeding. Mom observes some tongue restricition with this infant, but states Lupe is significantly easier to breastfeed than her first and that mom would prefer to work on improving her latch as much as possible before seeking treatment for potential tongue restriction. Infant is currently exclusively  8-12x/day and is latching primarily with a nipple shield. Feedings are painful throughout and even worse with intial latch. Infant is voiding, stooling, and gaining weight appropriately.   Maternal history   Maternal history PIH;Hypothyroid   Breastfeeding goal   Breastfeeding goal To maintain breast milk feeding per patient goal   Maternal Assessment   Bilateral Breasts Full;Symmetrical   Bilateral Nipples Everted;Sore   Right Nipple Skin intact;Healing well;Everted   Left Nipple Bruised;Sore;Everted   Prior breastfeeding experience (comment below) Multip   Prior BF experience: comment Used nipple shield for 3 months then had lip and tongue corrections with improvement in latch. BF for 13 months.   Breastfeeding Assistance Breastfeeding assistance provided with permission;Breast exam provided with permission   Pain assessment   Pain, additional Pain location   Location/Comment nipple pain improves slightly with deeper latch.   Treatment of Sore Nipples Deeper latch techniques;Hydrogel dressings as directed   Guidelines for use of:   Equipment Hydrogel dressings   Other (comment) Observed infant latch slightly shallow with nipple shield and noted bruising \"dots\" at mom's nipple tip--likely from suction pressure through the nipple shield. Coached  mom with relatching without the nipple shield using an exaggerated latch technique (\"flipple\") and observed mom relatch infant successfully using the technique well. Mom says intial pain is still intense, but nipples are less sore throughout the feeding. Infant transferred 64ml on the left side in about 10 minutes and then appeared satiated. Mom attempted to relatch on opposite side with some success, but infant was sleepy and only transferred 6ml before unlatching. Noted areolar edema bilaterally. Reviewed reverse pressure softening techniques to relieve edema. Mom also complained of ductal pain to left side post latch which is possibly due to ongoing nipple pain from latching. Recommended mom use cool packs to decrease swelling and manage ductal pain. Hydrogels given to support nipple healing. Mom will followup with lactation in one week to assess for nipple healing and latch improvement.

## 2024-10-08 NOTE — PROGRESS NOTES
Pt had  on 10/02/24. Pt currently taking nifedipine 30 mg daily. Pt denies any s/s of PIH. Current BP in office-120/83. Pt does have 2 week postpartum visit scheduled. Advised pt to continue to take Nifedipine as ordered, and to call with any questions or concerns.

## 2024-10-15 NOTE — PROGRESS NOTES
Subjective: Nohemy is 2 weeks postpartum after a vaginal delivery of a baby boy.  See L&D delivery summary for birth statistics.  She is without concerns today. Bleeding is decreasing and not experiencing any excessive pain.    She  had GHTN and was sent home on Nifedipine.     Breastfeeding successfully and reports infant is experiencing adequate weight gain.    She denies any signs/symptoms of postpartum depression and has adequate family support.  Denies any abuse.    Contraceptive plan: NFP    Review of Systems   Constitutional: Negative.    Respiratory: Negative.     Genitourinary: Negative.    Neurological: Negative.    Psychiatric/Behavioral: Negative.         Objective:   Vitals:    10/15/24 1201   BP: 105/70   Pulse: 74     Wt Readings from Last 6 Encounters:   10/15/24 157 lb (71.2 kg)   10/02/24 174 lb (78.9 kg)   09/26/24 173 lb 12.8 oz (78.8 kg)   09/19/24 172 lb (78 kg)   08/15/24 164 lb (74.4 kg)   08/01/24 162 lb 6.4 oz (73.7 kg)       Physical Exam  Constitutional:       General: She is not in acute distress.     Appearance: Normal appearance. She is not ill-appearing.   Pulmonary:      Effort: Pulmonary effort is normal.   Neurological:      Mental Status: She is alert and oriented to person, place, and time.   Psychiatric:         Mood and Affect: Mood normal.         Behavior: Behavior normal.         Thought Content: Thought content normal.        Assessment/Plan:   2 weeks postpartum, stable. Breastfeeding without difficulty  Contraception: NFP  Return to clinic: 4 wks for 6 wk PP visit  B/p on low side today. Recommend stop nifedipine and mointor b/p's. If start increasing once stop into 140's80's-90's then restart. To notify CNM if needs to re-start    Counseling included:     Postpartum danger signs  Lactation support and troubleshooting  Maternal and family adaption  Sleep/rest strategies  Sexuality  Contraception    Nohemy verbalized understanding of plan of care. All questions answered. No  barriers to learning identified.

## 2024-10-15 NOTE — PROGRESS NOTES
[facility]  Justina Maradiaga RN, BSN, IBCLC  [phone]        Birth Weight: 7lbs 10.4oz  Today's Naked Weight: 8 lbs 1.3 oz (above birth weight at 13 days old)                Lupe transferred 68 ml from breast today (40 ml right, 28 ml left). Great job!      FEEDING PLAN     Breastfeeding frequency: Continue breastfeeding on demand 8-12x/day. Make the best of 20-30 minutes (+/-) when feeding at breast, apply breast compressions and provide gentle stimulation as needed to encourage efficiency at breast.     Nipple pain: Continue practicing the deep latching techniques we discussed using breast to support chin. Avoid using the nipple shield if possible.      Pumping: Not indicated at this time, continue efforts to wean off nipple shield. Recommend to pump 3-4 times per day if nipple shield use persists and follow up with Lactation for continued support. Otherwise may introduce pumping when ready, preferably around 4 weeks postpartum or later. Start gradually 1-2 times per day and increase as schedule permits      Follow up: Return to the lactation clinic as needed, if nipple pain persists or unable to fully wean off nipple shield.

## 2024-10-15 NOTE — LACTATION NOTE
This note was copied from a baby's chart.     10/15/24 2783   Evaluation Type   Evaluation Type Outpatient Follow Up   Problems & Assessment   Problems Diagnosed or Identified Latch difficulty; feeding problem;Sleepy   Problems: comment/detail Nohemy is here with her 13 day old infant, Lupe, for follow up support r/t nipple pain and assistance with nipple shield weaning. Lupe continues to exclusively  8-12x/day, doing well on left breast however still requiring nipple shield when latching to right breast. Nipple pain has improved and currently rating latch discomfort about 4-5/10, reports no significant difference in comfort level when using nipple shield.  Nohemy has abundant milk supply which recently has led to difficulty with Lupe managing flow, and will gulp loudly and result in short, one-sided feedings during the daytime and more cluster-feeding overnight.   Infant Assessment Hunger cues present   Muscle tone Appropriate for GA   Feeding Assessment   Summary Current Feeding Adlib;Breastfeeding exclusively   Breastfeeding Assessment Assisted with breastfeeding w/mother's permission;Sustained nutrititive latch w/audible swallows;Deep latch achieved and observed;Tolerated feeding well   Breastfeeding lasted # of minutes 20   Breastfeeding Positions left breast;right breast;football   Latch 2   Audible Sucks/Swallows 2   Type of Nipple 2   Comfort (Breast/Nipple) 1   Hold (Positioning) 1   LATCH Score 8   Other (comment) Nohemy placed Lupe in cross-cradle hold on right breast for attempt to latch without nipple shield, Lupe observed to pucker lips and slip off breast with no latch achieved. Repositioned in football hold with breast resting on chin, Lupe instinctly resumed lip puckering against nipple but after teasing nipple against lips wide gape eventually observed with deep attachment to breast achieved. Immediate swallows heard with minimal support given for first couple minutes,  Nohemy reports minimal discomfort and rating pain at a very tolerable 2 out of 10 scale. About 5 minutes in some arching and popping off observed, then became sleepy at breast a few minutes after that. Attempted to wake Lupe for second breast with minimal hunger cues observed but after changing diaper became alert. Left breast offered again in football position using weight of breast to support chin. Lupe latched for an additional 10 minutes, again minimal discomfort with latch. Full feeding observed, nipple shield not needed. Total transfer of milk 68 ml.   Pre/Post Weights   Pre-Weight Right Breast (g) 3668   Post-Weight Right Breast (g) 3708   ml of milk, RT Brst 40   Pre-Weight Left Breast (g) 3708   Post-Weight Left Breast (g) 3736   ml of milk, LT Brst 28   ml of milk, total 68   Supplement total, ml 0   Feeding total ml 68

## 2024-10-15 NOTE — LACTATION NOTE
10/15/24 1206   Evaluation Type   Evaluation Type Outpatient Follow Up   Problems identified   Problems identified Milk supply WNL;Knowledge deficit;Nipple pain   Problems Identified Other Nohemy is here with her 13 day old infant, Lupe, for follow up support r/t nipple pain and assistance with nipple shield weaning. Lupe continues to exclusively  8-12x/day, doing well on left breast however still requiring nipple shield when latching to right breast. Nipple pain has improved and currently rating latch discomfort about 4-5/10, reports no significant difference in comfort level when using nipple shield. Nohemy has abundant milk supply which recently has led to difficulty with Lupe managing flow, and will gulp loudly and result in short, one-sided feedings during the daytime and more cluster-feeding overnight.   Breastfeeding goal   Breastfeeding goal To maintain breast milk feeding per patient goal   Maternal Assessment   Bilateral Breasts Soft;Symmetrical   Bilateral Nipples Slightly everted/short;Sore   Prior breastfeeding experience (comment below) Multip;Successful;Problems, continued   Prior BF experience: comment Used nipple shield for 3 months then had lip and tongue corrections with improvement in latch. BF for 13 months.   Breastfeeding Assistance Breastfeeding assistance provided with permission;Breast exam provided with permission   Pain assessment   Pain, additional Pain location   Pain Location Nipples   Treatment of Sore Nipples Deeper latch techniques   Guidelines for use of:   Current use of pump: 0   Suggested use of pump Pump after nursing if a nipple shield is used;Pump if infant is not latching to breast   Post-feed pumped volume not indicated   Other (comment) Nohemy placed Lupe in cross-cradle hold on right breast for attempt to latch without nipple shield, Lupe observed to pucker lips and slip off breast with no latch achieved. Repositioned in football hold with breast  resting on chin, Lupe instinctly resumed lip puckering against nipple but after teasing nipple against lips wide gape eventually observed with deep attachment to breast achieved. Immediate swallows heard with minimal support given for first couple minutes, Nohemy reports minimal discomfort and rating pain at a very tolerable 2 out of 10 scale. About 5 minutes in some arching and popping off observed, then became sleepy at breast a few minutes after that. Attempted to wake Lupe for second breast with minimal hunger cues observed but after changing diaper became alert. Left breast offered again in football position using weight of breast to support chin. Lupe latched for an additional 10 minutes, again minimal discomfort with latch. Full feeding observed, nipple shield not needed. Total transfer of milk 68 ml.

## 2024-11-14 NOTE — PROGRESS NOTES
Chief Complaint   Patient presents with    Postpartum Care     6 week pp, recently started to have bright red bleeding, about a quarter size. Breast feeding. Not interested in BC ROOPA-7 score 0        HPI:   Nohemy is 34 year old , here today for 6-week postpartum visit.  Type and date of Delivery:  on 10/2/24, Complications: Sekou was on nifedipine for 2 weeks PP with normal Bps continued checking bp after stopping nifedipine for a few days and bps have been normal. Normotensive today. Denies danger signs.   See Delivery Summary for baby's gender and weight  Perineum: 1st degree   Feeding Method: Breast  Bonding: well  Maternal sleep: 6 hours/night. Napping: sometimes  Sexual activity resumed: No. Contraceptive Method: NFP  Postpartum Depression screen: 0    Started having some vaginal spotting yesterday that has continued on to today, does not need pad yet but wondering if it may be her period; reports that postpartum bleeding has stopped a month after delivery. Denies vaginal irritation.     Pt offered for MA to be present during exam and patient declines    HISTORY:  Patient Active Problem List   Diagnosis    Hypothyroid    Gestational hypertension (HCC)    History of abnormal cervical Pap smear    Rh negative state in antepartum period (HCC)    Back pain affecting pregnancy (HCC)    Pregnancy (HCC)    Normal labor (HCC)     (normal spontaneous vaginal delivery) (HCC)    Postpartum care and examination of lactating mother (HCC)       Medications (Active prior to today's visit):  Current Outpatient Medications   Medication Sig Dispense Refill    levothyroxine 88 MCG Oral Tab       ARMOUR THYROID 90 MG Oral Tab Take 30 mg by mouth daily. 2 tabs (180 mg) Monday, Wednesday, Friday  3 tabs (270 mg) Tuesday, Thursday, Saturday,       Ferrous Gluconate (IRON 27 OR) Take 1 tablet by mouth at bedtime.      Cholecalciferol (VITAMIN D-3 OR) Take 5,000 Units by mouth at bedtime.      Ascorbic Acid  (VITAMIN C OR) Take 500 mg by mouth at bedtime.      Omega-3 1000 MG Oral Cap Take 1 capsule by mouth at bedtime.      CALCIUM CARBONATE OR Take 1,000 mg by mouth at bedtime.      prenatal vitamin with DHA 27-0.8-228 MG Oral Cap Take 1 capsule by mouth at bedtime.      Cyanocobalamin (VITAMIN B 12 OR) Take 5,000 mcg by mouth at bedtime.      NP THYROID 15 MG Oral Tab Take 3 tablets (45 mg total) by mouth daily. (Patient not taking: Reported on 11/14/2024)         Allergies:   Allergies[1]    PHYSICAL EXAM:   Vitals:    11/14/24 1234   BP: 115/80   Pulse: 66       Pre-pregnancy 23.92  Today's Body mass index is 27.99 kg/m².    Pre-pregnancy weight 135 lbs  Today's weight   Wt Readings from Last 6 Encounters:   11/14/24 158 lb (71.7 kg)   10/15/24 157 lb (71.2 kg)   10/02/24 174 lb (78.9 kg)   09/26/24 173 lb 12.8 oz (78.8 kg)   09/19/24 172 lb (78 kg)   08/15/24 164 lb (74.4 kg)    lbs    Physical Exam  Vitals reviewed.   HENT:      Head: Normocephalic.   Eyes:      Conjunctiva/sclera: Conjunctivae normal.   Cardiovascular:      Rate and Rhythm: Normal rate.   Pulmonary:      Effort: Pulmonary effort is normal.   Abdominal:      General: Abdomen is flat.      Comments: 1 fb diastasis    Genitourinary:     General: Normal vulva.      Vagina: Normal.      Cervix: Discharge and cervical bleeding present.      Adnexa:         Right: No mass, tenderness or fullness.          Left: No mass, tenderness or fullness.     Skin:     General: Skin is warm.   Neurological:      General: No focal deficit present.      Mental Status: She is alert.   Psychiatric:         Mood and Affect: Mood normal.        No results found for this or any previous visit (from the past 24 hours).       ASSESSMENT/PLAN:   Nohemy was seen today for postpartum care.    Diagnoses and all orders for this visit:    Postpartum care and examination (HCC)    Abnormal uterine bleeding, postpartum (HCC)  -     Vaginitis Vaginosis PCR Panel; Future  -      Vaginitis Vaginosis PCR Panel         Stop Taking                docusate sodium 100 MG Oral Cap    Take 100 mg by mouth 2 (two) times daily.     ibuprofen 600 MG Oral Tab    Take 1 tablet (600 mg total) by mouth every 6 (six) hours.     hydrocortisone 2.5 % External Cream    Place 1 Application rectally 2 (two) times daily.     NIFEdipine ER 30 MG Oral Tablet 24 Hr    Take 1 tablet (30 mg total) by mouth daily.           -Vaginosis/vaginitis swab collected  -instructed patient to call if bleeding does not resolve in the next week or with heavy bleeding, soaking > 1 pad per hour    Next annual due: 3-6 months  Follow-up/Return to clinic : for annual or PRN    Counseling:   Postpartum teaching including maternal and family adaptation, sleep/rest strategies, lactation and weaning (if applicable), reaching ideal body weight, sexuality/contraception, importance of Kegel's and abdominal exercises, continuation of prenatal vitamins, and signs/symptoms of postpartum depression  Patient verbalized understanding, All questions answered. No barriers to learning identified     WILNER Og under direct supervision of Yelena Kay CNM  Patient seen in conjunction with LENNIE. I personally witnessed the patient's exam and medical decision making on this date of service.  I was physically present in the room for the performance of the E/M service.  I have reviewed the CYNTHIA student's documentation and findings including history, Exam, and Medical Decision Making, edited the document for accuracy and verify that it represents the clinical findings and services performed.     Yelena Kay CNM       [1] No Known Allergies

## 2025-07-01 NOTE — PROGRESS NOTES
Subjective:   Nohemy Pierre is a 35 year old female who presents for Pain (Pt states she has been having pain behind her left knee that has been ongoing for a month and she states its more painful when she's bending. )    HPI   Patient presents with knee pain involving the left knee. Onset of the symptoms was 2 months ago. Inciting event: none known. Current symptoms include pain located posterior and stiffness. Pain is aggravated by kneeling, rising after sitting, and bending the knee. Patient has had prior knee problems. Evaluation to date: none. Treatment to date: none.    History/Other:      Chief Complaint Reviewed and Verified  Nursing Notes Reviewed and   Verified  Tobacco Reviewed  Allergies Reviewed  Medications Reviewed    Problem List Reviewed  Medical History Reviewed  Surgical History   Reviewed  OB Status Reviewed  Family History Reviewed  Social History   Reviewed           Tobacco:  She has never smoked tobacco.      Current Medications[1]    Allergies[2]      Review of Systems   Constitutional: Negative.  Negative for activity change and fatigue.   HENT: Negative.  Negative for congestion, ear pain, rhinorrhea and sneezing.    Eyes: Negative.  Negative for redness.   Respiratory: Negative.  Negative for cough, shortness of breath and wheezing.    Cardiovascular: Negative.  Negative for chest pain.   Gastrointestinal: Negative.  Negative for abdominal pain, constipation, diarrhea, nausea and vomiting.   Endocrine: Negative.    Genitourinary: Negative.  Negative for difficulty urinating and frequency.   Musculoskeletal:  Positive for arthralgias. Negative for back pain, joint swelling and myalgias.   Skin: Negative.  Negative for rash.   Allergic/Immunologic: Negative.    Neurological: Negative.  Negative for dizziness, syncope, light-headedness and headaches.   Hematological: Negative.    Psychiatric/Behavioral: Negative.           Objective:   /78 (BP Location: Left arm,  Patient Position: Sitting, Cuff Size: adult)   Pulse 87   Ht 5' 3\" (1.6 m)   Wt 153 lb (69.4 kg)   LMP 06/08/2025   SpO2 97%   Breastfeeding Yes   BMI 27.10 kg/m²  Estimated body mass index is 27.1 kg/m² as calculated from the following:    Height as of this encounter: 5' 3\" (1.6 m).    Weight as of this encounter: 153 lb (69.4 kg).      Physical Exam  Vitals and nursing note reviewed.   Constitutional:       Appearance: Normal appearance. She is normal weight.   HENT:      Head: Normocephalic and atraumatic.      Right Ear: Tympanic membrane normal.      Left Ear: Tympanic membrane normal.      Nose: Nose normal.      Mouth/Throat:      Mouth: Mucous membranes are moist.      Pharynx: Oropharynx is clear.   Eyes:      Extraocular Movements: Extraocular movements intact.      Conjunctiva/sclera: Conjunctivae normal.      Pupils: Pupils are equal, round, and reactive to light.   Cardiovascular:      Rate and Rhythm: Normal rate and regular rhythm.      Pulses: Normal pulses.      Heart sounds: Normal heart sounds.   Pulmonary:      Effort: Pulmonary effort is normal.      Breath sounds: Normal breath sounds.   Abdominal:      General: Abdomen is flat. Bowel sounds are normal.      Palpations: Abdomen is soft.   Musculoskeletal:      Cervical back: Normal range of motion and neck supple.      Left knee: Decreased range of motion.        Legs:    Skin:     General: Skin is warm and dry.   Neurological:      General: No focal deficit present.      Mental Status: She is alert and oriented to person, place, and time. Mental status is at baseline.   Psychiatric:         Mood and Affect: Mood normal.         Behavior: Behavior normal.         Thought Content: Thought content normal.         Judgment: Judgment normal.           Assessment & Plan:     Assessment & Plan  Acute pain of left knee  -Advised to take medications as prescribed  -Ice the knee for 15-20 minutes 3-4 times per day  -Apply knee compression  sleeve/use ACE bandage on until swelling goes down  -Rest and elevate the knee as much as possible  -X-ray of knee ordered  -Physical Therapy  will order if pain is not improved.   -If not improving or symptoms worsening then will refer to ortho.      Orders:    XR KNEE (3 VIEWS), LEFT (CPT=73562); Future    predniSONE 20 MG Oral Tab; Take 1 tablet (20 mg total) by mouth 2 (two) times daily for 5 days.      Medication use, effects and side effects discussed in detail with patient. The patient indicated understanding of the diagnosis and agreed with the plan of care.    Return if symptoms worsen or fail to improve.    NAOMI Avila       [1]   Current Outpatient Medications   Medication Sig Dispense Refill    NP THYROID 60 MG Oral Tab Take 1 tablet (60 mg total) by mouth daily.      predniSONE 20 MG Oral Tab Take 1 tablet (20 mg total) by mouth 2 (two) times daily for 5 days. 10 tablet 0    levothyroxine 88 MCG Oral Tab       Ferrous Gluconate (IRON 27 OR) Take 1 tablet by mouth at bedtime.      Cholecalciferol (VITAMIN D-3 OR) Take 5,000 Units by mouth at bedtime.      Ascorbic Acid (VITAMIN C OR) Take 500 mg by mouth at bedtime.      Omega-3 1000 MG Oral Cap Take 1 capsule by mouth at bedtime.      prenatal vitamin with DHA 27-0.8-228 MG Oral Cap Take 1 capsule by mouth at bedtime.      Cyanocobalamin (VITAMIN B 12 OR) Take 5,000 mcg by mouth at bedtime.      NP THYROID 15 MG Oral Tab Take 3 tablets (45 mg total) by mouth daily. (Patient not taking: Reported on 7/1/2025)      CALCIUM CARBONATE OR Take 1,000 mg by mouth at bedtime. (Patient not taking: Reported on 7/1/2025)     [2] No Known Allergies

## 2025-07-16 NOTE — TELEPHONE ENCOUNTER
Isabela Donaldson ==see message . Physical Therapy or MRI ?     Please respond directly to the patient if no additional staff support is required.        7/10/25 XRAY ;  Impression   IMPRESSION: UNREMARKABLE EXAMINATION. IF SYMPTOMS PERSIST THEN MRI SHOULD BE OBTAINED.

## 2025-07-21 NOTE — PROGRESS NOTES
[facility] Orthopaedic New Consult         The following individual(s) verbally consented to be recorded using ambient AI listening technology and understand that they can each withdraw their consent to this listening technology at any point by asking the clinician to turn off or pause the recording:    Patient name: Nohemy Pierre    Chief Complaint   Patient presents with    Knee Pain     LEFT KNEE  -Pain in the back of knee     History of Present Illness  Nohemy Pierre is a 35 year old female who presents orthopedic consultation at the request of NAOMI Avila with chronic posterior lateral left knee pain.    She has been experiencing pain at the back of her knee for the past two to three months, which began randomly and has progressively worsened. The pain is particularly pronounced during movements such as kneeling but not during squatting. It becomes stiff after driving for an hour or more, and she feels as though the knee might 'pop'. There is no history of recent injury or increase in physical activity.    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 She has a history of playing basketball through college but denies any significant injuries from that time.    She also reports a history of left lower back pain since her first pregnancy, which has recently started to recur. She experiences occasional numbness, particularly when driving, from the back of the knee to the side, but denies tingling.    During the review of symptoms, she notes pain when kneeling, particularly in the lateral aspect of the back of the knee, but no pain during squatting.    Past Medical History[1]  Past Surgical History[2]  Current Medications[3]  Allergies[4]  Family History[5]  Social History     Occupational History    Occupation: Nurse     Comment: Full time   Tobacco Use    Smoking status: Never     Passive exposure: Never    Smokeless tobacco:  Never   Vaping Use    Vaping status: Never Used   Substance and Sexual Activity    Alcohol use: Not Currently     Comment: socially    Drug use: Never    Sexual activity: Yes        ROS:  Complete ROS reviewed by me and non-contributory to the chief complaint except as mentioned above.    Physical Exam:    Ht 5' 3\" (1.6 m)   Wt 153 lb (69.4 kg)   LMP 06/08/2025   BMI 27.10 kg/m²   Constitutional: Well developed, well nourished, pleasant 35 year old female  Psychological: NAD, alert and appropriate  Respiratory: Breathing comfortably on room air with RR of 10-14  Cardiac: Palpable distal pulses with pink warm extremities distally  On examination there is no apparent swelling or discoloration about the left knee.  There is significant posterior lateral joint line tenderness to palpation with a discomfort on Yvon's and Steinmann's test.  Knee ligaments are stable on varus valgus stress with solid endpoints.  Lachman and posterior drawer are normal.  Range of motion testing demonstrates adequate full extension but discomfort on passive hyperflexion.  There is no popliteal tenderness, pain on Isaak's test or distal edema about the foot and ankle.  Distal sensory to light touch, motor function and perfusion are intact.       Imaging Results: Multiple images left knee personally viewed, demonstrating no acute fracture or dislocation.  Joint spaces are relatively preserved with no late degenerative changes.      XR KNEE (3 VIEWS), LEFT (CPT=73562)  Result Date: 7/16/2025  IMPRESSION: UNREMARKABLE EXAMINATION. IF SYMPTOMS PERSIST THEN MRI SHOULD BE OBTAINED. Dictated by: Paddy Harris MD Electronically Verified and Signed by Attending Radiologist: Paddy Harris MD 7/16/2025 7:14 AM Workstation: [id number]       Assessment: Diagnoses and all orders for this visit:    Diagnoses and all orders for this visit:    Chronic pain of left knee      Plan: We reviewed imaging and exam findings with the patient.  The plain  x-rays are relatively unremarkable, but history and physical exam findings are suggestive of a symptomatic tear of the lateral meniscus.  Symptoms have been chronic for the past 2 to 3 months.  We discussed the possibility of referred pain from the lumbar spine but exam suggest localized lateral joint line tenderness.  Referencing the imaging findings, we had a long discussion regarding the nature of this diagnosis and treatment options.  This includes continued conservative care with activity protection, medications including possible injection of a corticosteroid to decrease inflammation or further imaging with an MRI.  In light of the concerning exam findings and continuing symptoms despite initial conservative care, I would recommend further imaging with an MRI.  We discussed in detail the nature of the imaging study which requires insurance precertification.  We will submit for this and see the patient back in follow-up for reassessment once test results become available.  All questions were answered the patient verbalized understanding and appreciation.  Patient was asked to avoid any heavy lifting, impact, pivoting or athletic activity until we have more information.  Conservative measures may be continued including rest, icing, and anti-inflammatories as needed.      Lakeshia Mullen MD, [facility]  Orthopaedic Surgery   Sports Medicine/Knee and Shoulder  [facility]  t: [phone]  f: [phone]             This document was partially prepared using Dragon Medical voice recognition software.  Although every attempt is made to correct errors during dictation, discrepancies may still exist.                [1]   Past Medical History:   Acute cholecystitis    Calculus of bile duct with acute cholecystitis and obstruction    Chicken pox    Childhood    Decorative tattoo    Dysmenorrhea    Gestational hypertension, third trimester (HCC)    Hypokalemia    Hypothyroid   [2]    Past Surgical History:  Procedure Laterality Date    Cholecystectomy     [3]   Current Outpatient Medications   Medication Sig Dispense Refill    NP THYROID 60 MG Oral Tab Take 1 tablet (60 mg total) by mouth daily.      levothyroxine 88 MCG Oral Tab       Ferrous Gluconate (IRON 27 OR) Take 1 tablet by mouth at bedtime.      Cholecalciferol (VITAMIN D-3 OR) Take 5,000 Units by mouth at bedtime.      Ascorbic Acid (VITAMIN C OR) Take 500 mg by mouth at bedtime.      Omega-3 1000 MG Oral Cap Take 1 capsule by mouth at bedtime.      prenatal vitamin with DHA 27-0.8-228 MG Oral Cap Take 1 capsule by mouth at bedtime.      Cyanocobalamin (VITAMIN B 12 OR) Take 5,000 mcg by mouth at bedtime.      NP THYROID 15 MG Oral Tab Take 3 tablets (45 mg total) by mouth daily. (Patient not taking: Reported on 7/21/2025)      CALCIUM CARBONATE OR Take 1,000 mg by mouth at bedtime. (Patient not taking: Reported on 7/21/2025)     [4] No Known Allergies  [5]   Family History  Problem Relation Age of Onset    Hypertension Father     Lipids Mother     Other (Other) Mother         Hypothyroid    Cancer Brother         Leukemia    Stroke Maternal Grandmother     Other (Other) Maternal Grandfather         Alzheimer's    Cancer Paternal Grandfather         Prostate

## (undated) DIAGNOSIS — R10.9 ABDOMINAL PAIN DURING PREGNANCY IN FIRST TRIMESTER: Primary | ICD-10-CM

## (undated) DIAGNOSIS — O02.81 INAPPROPRIATE CHANGE IN QUANTITATIVE HCG IN EARLY PREGNANCY: Primary | ICD-10-CM

## (undated) DIAGNOSIS — R10.2 PELVIC PAIN: Primary | ICD-10-CM

## (undated) DIAGNOSIS — O03.9 SAB (SPONTANEOUS ABORTION): Primary | ICD-10-CM

## (undated) DIAGNOSIS — O26.891 ABDOMINAL PAIN DURING PREGNANCY IN FIRST TRIMESTER: Primary | ICD-10-CM

## (undated) DIAGNOSIS — O36.80X0 PREGNANCY WITH INCONCLUSIVE FETAL VIABILITY, SINGLE OR UNSPECIFIED FETUS: Primary | ICD-10-CM

## (undated) DIAGNOSIS — N92.6 MISSED MENSES: Primary | ICD-10-CM

## (undated) DIAGNOSIS — O02.1 MISSED ABORTION: Primary | ICD-10-CM

## (undated) NOTE — LETTER
[address], [place]  Authorization for Invasive Procedures  Date: 5/10/2023          Time: 0100     I hereby authorize Dr. Tejinder Huffman, my physician and his/her assistants (if applicable), which may include medical students, residents, and/or fellows, to perform the following surgical operation/ procedure and administer such anesthesia as may be determined necessary by my physician: Dr. Tejinder Huffman on [facility]  2. I recognize that during the surgical operation/procedure, unforeseen conditions may necessitate additional or different procedures than those listed above. I, therefore, further authorize and request that the above-named surgeon, assistants, or designees perform such procedures as are, in their judgment, necessary and desirable. 3.   My surgeon/physician has discussed prior to my surgery the potential benefits, risks and side effects of this procedure; the likelihood of achieving goals; and potential problems that might occur during recuperation. They also discussed reasonable alternatives to the procedure, including risks, benefits, and side effects related to the alternatives and risks related to not receiving this procedure. I have had all my questions answered and I acknowledge that no guarantee has been made as to the result that may be obtained. 4.   Should the need arise during my operation/procedure, which includes change of level of care prior to discharge, I also consent to the administration of blood and/or blood products. Further, I understand that despite careful testing and screening of blood or blood products by collecting agencies, I may still be subject to ill effects as a result of receiving a blood transfusion and/or blood products.   The following are some, but not all, of the potential risks that can occur: fever and allergic reactions, hemolytic reactions, transmission of diseases such as Hepatitis, AIDS and Cytomegalovirus (CMV) and fluid overload. In the event that I wish to have an autologous transfusion of my own blood, or a directed donor transfusion, I will discuss this with my physician. Check only if Refusing Blood or Blood Products  I understand refusal of blood or blood products as deemed necessary by my physician may have serious consequences to my condition to include possible death. I hereby assume responsibility for my refusal and release the hospital, its personnel, and my physicians from any responsibility for the consequences of my refusal.         o  Refuse         5. I authorize the use of any specimen, organs, tissues, body parts or foreign objects that may be removed from my body during the operation/procedure for diagnosis, research or teaching purposes and their subsequent disposal by hospital authorities. I also authorize the release of specimen test results and/or written reports to my treating physician on the hospital medical staff or other referring or consulting physicians involved in my care, at the discretion of the Pathologist or my treating physician. 6.   I consent to the photographing or videotaping of the operations or procedures to be performed, including appropriate portions of my body for medical, scientific, or educational purposes, provided my identity is not revealed by the pictures or by descriptive texts accompanying them. If the procedure has been photographed/videotaped, the surgeon will obtain the original picture, image, videotape or CD. The hospital will not be responsible for storage, release or maintenance of the picture, image, tape or CD.    7.   I consent to the presence of a  or observers in the operating room as deemed necessary by my physician or their designees. 8.   I recognize that in the event my procedure results in extended X-Ray/fluoroscopy time, I may develop a skin reaction. 9.  If I have a Do Not Attempt Resuscitation (DNAR) order in place, that status will be suspended while in the operating room, procedural suite, and during the recovery period unless otherwise explicitly stated by me (or a person authorized to consent on my behalf). The surgeon or my attending physician will determine when the applicable recovery period ends for purposes of reinstating the DNAR order. 10. Patients having a sterilization procedure: I understand that if the procedure is successful the results will be permanent and it will therefore be impossible for me to inseminate, conceive, or bear children. I also understand that the procedure is intended to result in sterility, although the result has not been guaranteed. 11. I acknowledge that my physician has explained sedation/analgesia administration to me including the risk and benefits I consent to the administration of sedation/analgesia as may be necessary or desirable in the judgment of my physician. I CERTIFY THAT I HAVE READ AND FULLY UNDERSTAND THE ABOVE CONSENT TO OPERATION and/or OTHER PROCEDURE.        ____________________________________       _________________________________      ______________________________  Signature of Patient         Signature of Responsible Person        Printed Name of Responsible Person        ____________________________________      _________________________________      ______________________________       Signature of Witness          Relationship to Patient                       Date                                       Time    Patient Name: Domenic Colunga     : 1990                 Printed: May 10, 2023      Medical Record #: [id number]                      Page 1 of 2          [place] My signature below affirms that prior to the time of the procedure; I have explained to the patient and/or his/her legal representative, the risks and benefits involved in the proposed treatment and any reasonable alternative to the proposed treatment.  I have also explained the risks and benefits involved in refusal of the proposed treatment and alternatives to the proposed treatment and have answered the patient's questions.  If I have a significant financial interest in a co-management agreement or a significant financial interest in any product or implant, or other significant relationship used in this procedure/surgery, I have disclosed this and had a discussion with my patient.     _______________________________________________________________ _____________________________  [facility])                                                                                         (Date)                                   (Time)    Patient Name: Layvonne Cooks     : 1990                 Printed: May 10, 2023      Medical Record #: [id number]                      Page 2 of 2

## (undated) NOTE — LETTER
10/31/2023              Yulisa Dallas        [address] [place] [id number]         Dear Shelby Guerrero,    [facility] records indicate that the tests ordered for you by Merry Khalil MD  have not been done. If you have, in fact, already completed the tests or you do not wish to have the tests done, please contact our office at [address] [place] [address]. Otherwise, please proceed with the testing. To schedule a test at any Critical access hospital, call [facility] at [phone], Monday through Friday between 7:30am to 6pm and on Saturday between 8am and 1pm.   Evening and weekend appointments for your exam are available.      Imaging order :  XR ABDOMEN (1 VIEW) (CPT=74018)       Sincerely,    Merry Khalil MD  [facility], [place] [address]  [phone]

## (undated) NOTE — LETTER
AGREEMENT FOR TREATMENT WITH Rh IMMUNE GLOBULIN          To:    Yelena Kay CNM (provider) and [facility], [place], [place]     Date: August 1, 2024   Time: 3:23 PM    I authorize the administration of Rh Immune Globulin for    Nohemy Pierre (myself or name of patient) as deemed advisable in the judgment of the attending physician of his associates or assistants.     It is understood and agreed that the attending physician or his associates and assistants shall be responsible only for the performance of their own individual professional acts and that the blood typing and the selection of compatible Rh Immune Globulin are the responsibilities of those who actually perform the tests.   It has been fully explained that immunization with Rh Immune Globulin is not always successful in producing the desired result.             _____________________________________________  (Patient or person with authority to consent for patient)        _____________________________________________  (Relationship to patient)        _____________________________________________  (Witness)

## (undated) NOTE — LETTER
[address] [facility] [address] [place], [place]  Authorization for Surgical Operation and Procedure                                                                                           I hereby authorize Dena Mon MD, my physician and his/her assistants (if applicable), which may include medical students, residents, and/or fellows, to perform the following surgical operation/ procedure and administer such anesthesia as may be determined necessary by my physician: Operation/Procedure name (s) ENDOSCOPIC ULTRASOUND (EUS) and ENDOSCOPIC RETROGRADE CHOLANGIOPANCREATOGRAPHY on Deann Lewis   2. I recognize that during the surgical operation/procedure, unforeseen conditions may necessitate additional or different procedures than those listed above. I, therefore, further authorize and request that the above-named surgeon, assistants, or designees perform such procedures as are, in their judgment, necessary and desirable. 3.   My surgeon/physician has discussed prior to my surgery the potential benefits, risks and side effects of this procedure; the likelihood of achieving goals; and potential problems that might occur during recuperation. They also discussed reasonable alternatives to the procedure, including risks, benefits, and side effects related to the alternatives and risks related to not receiving this procedure. I have had all my questions answered and I acknowledge that no guarantee has been made as to the result that may be obtained. 4.   Should the need arise during my operation/procedure, which includes change of level of care prior to discharge, I also consent to the administration of blood and/or blood products. Further, I understand that despite careful testing and screening of blood or blood products by collecting agencies, I may still be subject to ill effects as a result of receiving a blood transfusion and/or blood products.   The following are some, but not all, of the potential risks that can occur: fever and allergic reactions, hemolytic reactions, transmission of diseases such as Hepatitis, AIDS and Cytomegalovirus (CMV) and fluid overload. In the event that I wish to have an autologous transfusion of my own blood, or a directed donor transfusion, I will discuss this with my physician. Check only if Refusing Blood or Blood Products  I understand refusal of blood or blood products as deemed necessary by my physician may have serious consequences to my condition to include possible death. I hereby assume responsibility for my refusal and release the hospital, its personnel, and my physicians from any responsibility for the consequences of my refusal.    o  Refuse   5. I authorize the use of any specimen, organs, tissues, body parts or foreign objects that may be removed from my body during the operation/procedure for diagnosis, research or teaching purposes and their subsequent disposal by hospital authorities. I also authorize the release of specimen test results and/or written reports to my treating physician on the hospital medical staff or other referring or consulting physicians involved in my care, at the discretion of the Pathologist or my treating physician. 6.   I consent to the photographing or videotaping of the operations or procedures to be performed, including appropriate portions of my body for medical, scientific, or educational purposes, provided my identity is not revealed by the pictures or by descriptive texts accompanying them. If the procedure has been photographed/videotaped, the surgeon will obtain the original picture, image, videotape or CD. The hospital will not be responsible for storage, release or maintenance of the picture, image, tape or CD.    7.   I consent to the presence of a  or observers in the operating room as deemed necessary by my physician or their designees.     8.   I recognize that in the event my procedure results in extended X-Ray/fluoroscopy time, I may develop a skin reaction. 9. If I have a Do Not Attempt Resuscitation (DNAR) order in place, that status will be suspended while in the operating room, procedural suite, and during the recovery period unless otherwise explicitly stated by me (or a person authorized to consent on my behalf). The surgeon or my attending physician will determine when the applicable recovery period ends for purposes of reinstating the DNAR order. 10. Patients having a sterilization procedure: I understand that if the procedure is successful the results will be permanent and it will therefore be impossible for me to inseminate, conceive, or bear children. I also understand that the procedure is intended to result in sterility, although the result has not been guaranteed. 11. I acknowledge that my physician has explained sedation/analgesia administration to me including the risk and benefits I consent to the administration of sedation/analgesia as may be necessary or desirable in the judgment of my physician. I CERTIFY THAT I HAVE READ AND FULLY UNDERSTAND THE ABOVE CONSENT TO OPERATION and/or OTHER PROCEDURE.     _________________________________________ _________________________________     ___________________________________  Signature of Patient     Signature of Responsible Person                   Printed Name of Responsible Person                              _________________________________________ ______________________________        ___________________________________  Signature of Witness         Date  Time         Relationship to Patient    STATEMENT OF PHYSICIAN My signature below affirms that prior to the time of the procedure; I have explained to the patient and/or his/her legal representative, the risks and benefits involved in the proposed treatment and any reasonable alternative to the proposed treatment.  I have also explained the risks and benefits involved in refusal of the proposed treatment and alternatives to the proposed treatment and have answered the patient's questions.  If I have a significant financial interest in a co-management agreement or a significant financial interest in any product or implant, or other significant relationship used in this procedure/surgery, I have disclosed this and had a discussion with my patient.     _______________________________________________________________ _____________________________  (Signature of Physician)                                                                                         (Date)                                   (Time)  Patient Name: Adela Ellis    : 1990   Printed: 2023      Medical Record #: [id number]                                              Page 1 of 1

## (undated) NOTE — LETTER
[address] [place] [address]  Authorization for Invasive Procedures  Date: 4/14/23           Time: 10:51    I hereby authorize Dr. Candie Perdomo, my physician and his/her assistants (if applicable), which may include medical students, residents, and/or fellows, to perform the following surgical operation/ procedure and administer such anesthesia as may be determined necessary by my physician: endoscopic ultrasound and endoscopic retrograde cholangiopancreatography on Aldona Coffee  2. I recognize that during the surgical operation/procedure, unforeseen conditions may necessitate additional or different procedures than those listed above. I, therefore, further authorize and request that the above-named surgeon, assistants, or designees perform such procedures as are, in their judgment, necessary and desirable. 3.   My surgeon/physician has discussed prior to my surgery the potential benefits, risks and side effects of this procedure; the likelihood of achieving goals; and potential problems that might occur during recuperation. They also discussed reasonable alternatives to the procedure, including risks, benefits, and side effects related to the alternatives and risks related to not receiving this procedure. I have had all my questions answered and I acknowledge that no guarantee has been made as to the result that may be obtained. 4.   Should the need arise during my operation/procedure, which includes change of level of care prior to discharge, I also consent to the administration of blood and/or blood products. Further, I understand that despite careful testing and screening of blood or blood products by collecting agencies, I may still be subject to ill effects as a result of receiving a blood transfusion and/or blood products.   The following are some, but not all, of the potential risks that can occur: fever and allergic reactions, hemolytic reactions, transmission of diseases such as Hepatitis, AIDS and Cytomegalovirus (CMV) and fluid overload. In the event that I wish to have an autologous transfusion of my own blood, or a directed donor transfusion, I will discuss this with my physician. Check only if Refusing Blood or Blood Products  I understand refusal of blood or blood products as deemed necessary by my physician may have serious consequences to my condition to include possible death. I hereby assume responsibility for my refusal and release the hospital, its personnel, and my physicians from any responsibility for the consequences of my refusal.         o  Refuse         5. I authorize the use of any specimen, organs, tissues, body parts or foreign objects that may be removed from my body during the operation/procedure for diagnosis, research or teaching purposes and their subsequent disposal by hospital authorities. I also authorize the release of specimen test results and/or written reports to my treating physician on the hospital medical staff or other referring or consulting physicians involved in my care, at the discretion of the Pathologist or my treating physician. 6.   I consent to the photographing or videotaping of the operations or procedures to be performed, including appropriate portions of my body for medical, scientific, or educational purposes, provided my identity is not revealed by the pictures or by descriptive texts accompanying them. If the procedure has been photographed/videotaped, the surgeon will obtain the original picture, image, videotape or CD. The hospital will not be responsible for storage, release or maintenance of the picture, image, tape or CD.    7.   I consent to the presence of a  or observers in the operating room as deemed necessary by my physician or their designees. 8.   I recognize that in the event my procedure results in extended X-Ray/fluoroscopy time, I may develop a skin reaction. 9.  If I have a Do Not Attempt Resuscitation (DNAR) order in place, that status will be suspended while in the operating room, procedural suite, and during the recovery period unless otherwise explicitly stated by me (or a person authorized to consent on my behalf). The surgeon or my attending physician will determine when the applicable recovery period ends for purposes of reinstating the DNAR order. 10. Patients having a sterilization procedure: I understand that if the procedure is successful the results will be permanent and it will therefore be impossible for me to inseminate, conceive, or bear children. I also understand that the procedure is intended to result in sterility, although the result has not been guaranteed. 11. I acknowledge that my physician has explained sedation/analgesia administration to me including the risk and benefits I consent to the administration of sedation/analgesia as may be necessary or desirable in the judgment of my physician.     I CERTIFY THAT I HAVE READ AND FULLY UNDERSTAND THE ABOVE CONSENT TO OPERATION and/or OTHER PROCEDURE.        ____________________________________       _________________________________      ______________________________  Signature of Patient         Signature of Responsible Person        Printed Name of Responsible Person        ____________________________________      _________________________________      ______________________________       Signature of Witness          Relationship to Patient                       Date                                       Time    Patient Name: Chantelle George     : 1990                 Printed: 2023      Medical Record #: [id number]                      Page 1 of 2          [place] My signature below affirms that prior to the time of the procedure; I have explained to the patient and/or his/her legal representative, the risks and benefits involved in the proposed treatment and any reasonable alternative to the proposed treatment. I have also explained the risks and benefits involved in refusal of the proposed treatment and alternatives to the proposed treatment and have answered the patient's questions.  If I have a significant financial interest in a co-management agreement or a significant financial interest in any product or implant, or other significant relationship used in this procedure/surgery, I have disclosed this and had a discussion with my patient.     _______________________________________________________________ _____________________________  Lexie Bazan of Physician)                                                                                         (Date)                                   (Time)    Patient Name: Rafael Alarcon     : 1990                 Printed: 2023      Medical Record #: [id number]                      Page 2 of 2

## (undated) NOTE — LETTER
[facility]  Administration of Anesthesia  I, Nohemy Pierre agree to be cared for by a physician anesthesiologist alone and/or with a nurse anesthetist, who is specially trained to monitor me and give me medicine to put me to sleep or keep me comfortable during my procedure    I understand that my anesthesiologist and/or anesthetist is not an employee or agent of [facility] or [employer]. He or she works for [facility]    As the patient asking for anesthesia services, I agree to:  Allow the anesthesiologist (anesthesia doctor) to give me medicine and do additional procedures as necessary. Some examples are: Starting or using an “IV” to give me medicine, fluids or blood during my procedure, and having a breathing tube placed to help me breathe when I’m asleep (intubation). In the event that my heart stops working properly, I understand that my anesthesiologist will make every effort to sustain my life, unless otherwise directed by [facility] Do Not Resuscitate documents.  Tell my anesthesia doctor before my procedure:  If I am pregnant.  The last time that I ate or drank.  iii. All of the medicines I take (including prescriptions, herbal supplements, and pills I can buy without a prescription (including street drugs/illegal medications). Failure to inform my anesthesiologist about these medicines may increase my risk of anesthetic complications.  iv.If I am allergic to anything or have had a reaction to anesthesia before.  I understand how the anesthesia medicine will help me (benefits).  I understand that with any type of anesthesia medicine there are risks:  The most common risks are: nausea, vomiting, sore throat, muscle soreness, damage to my eyes, mouth, or teeth (from breathing tube placement).  Rare risks include: remembering what happened during my procedure, allergic reactions to medications, injury to my airway, heart, lungs, vision, nerves, or  muscles and in extremely rare instances death.  My doctor has explained to me other choices available to me for my care (alternatives).  Pregnant Patients (“epidural”):  I understand that the risks of having an epidural (medicine given into my back to help control pain during labor), include itching, low blood pressure, difficulty urinating, headache or slowing of the baby’s heart. Very rare risks include infection, bleeding, seizure, irregular heart rhythms and nerve injury.  Regional Anesthesia (“spinal”, “epidural”, & “nerve blocks”):  I understand that rare but potential complications include headache, bleeding, infection, seizure, irregular heart rhythms, and nerve injury.    _____________________________________________________________________________  Patient (or Representative) Signature/Relationship to Patient  Date   Time    _____________________________________________________________________________   Name (if used)    Language/Organization   Time    _____________________________________________________________________________  Nurse Anesthetist Signature     Date   Time  _____________________________________________________________________________  Anesthesiologist Signature     Date   Time  I have discussed the procedure and information above with the patient (or patient’s representative) and answered their questions. The patient or their representative has agreed to have anesthesia services.    _____________________________________________________________________________  Witness        Date   Time  I have verified that the signature is that of the patient or patient’s representative, and that it was signed before the procedure  Patient Name: Nohemy Pierre     : 1990                 Printed: 10/2/2024 at 10:20 AM    Medical Record #: [id number]                                            Page 1 of 1  ----------ANESTHESIA CONSENT----------

## (undated) NOTE — LETTER
[address]  [address] [place], [place]  Authorization for Surgical Operation and Procedure                                                                                           I hereby authorize Ayanna Rodriguez MD, my physician and his/her assistants (if applicable), which may include medical students, residents, and/or fellows, to perform the following surgical operation/ procedure and administer such anesthesia as may be determined necessary by my physician: Operation/Procedure name (s) Laparoscopic cholecystectomy on Ezra Ferguson   2. I recognize that during the surgical operation/procedure, unforeseen conditions may necessitate additional or different procedures than those listed above. I, therefore, further authorize and request that the above-named surgeon, assistants, or designees perform such procedures as are, in their judgment, necessary and desirable. 3.   My surgeon/physician has discussed prior to my surgery the potential benefits, risks and side effects of this procedure; the likelihood of achieving goals; and potential problems that might occur during recuperation. They also discussed reasonable alternatives to the procedure, including risks, benefits, and side effects related to the alternatives and risks related to not receiving this procedure. I have had all my questions answered and I acknowledge that no guarantee has been made as to the result that may be obtained. 4.   Should the need arise during my operation/procedure, which includes change of level of care prior to discharge, I also consent to the administration of blood and/or blood products. Further, I understand that despite careful testing and screening of blood or blood products by collecting agencies, I may still be subject to ill effects as a result of receiving a blood transfusion and/or blood products.   The following are some, but not all, of the potential risks that can occur: fever and allergic reactions, hemolytic reactions, transmission of diseases such as Hepatitis, AIDS and Cytomegalovirus (CMV) and fluid overload. In the event that I wish to have an autologous transfusion of my own blood, or a directed donor transfusion, I will discuss this with my physician. Check only if Refusing Blood or Blood Products  I understand refusal of blood or blood products as deemed necessary by my physician may have serious consequences to my condition to include possible death. I hereby assume responsibility for my refusal and release the hospital, its personnel, and my physicians from any responsibility for the consequences of my refusal.    o  Refuse   5. I authorize the use of any specimen, organs, tissues, body parts or foreign objects that may be removed from my body during the operation/procedure for diagnosis, research or teaching purposes and their subsequent disposal by hospital authorities. I also authorize the release of specimen test results and/or written reports to my treating physician on the hospital medical staff or other referring or consulting physicians involved in my care, at the discretion of the Pathologist or my treating physician. 6.   I consent to the photographing or videotaping of the operations or procedures to be performed, including appropriate portions of my body for medical, scientific, or educational purposes, provided my identity is not revealed by the pictures or by descriptive texts accompanying them. If the procedure has been photographed/videotaped, the surgeon will obtain the original picture, image, videotape or CD. The hospital will not be responsible for storage, release or maintenance of the picture, image, tape or CD.    7.   I consent to the presence of a  or observers in the operating room as deemed necessary by my physician or their designees.     8.   I recognize that in the event my procedure results in extended X-Ray/fluoroscopy time, I may develop a skin reaction. 9. If I have a Do Not Attempt Resuscitation (DNAR) order in place, that status will be suspended while in the operating room, procedural suite, and during the recovery period unless otherwise explicitly stated by me (or a person authorized to consent on my behalf). The surgeon or my attending physician will determine when the applicable recovery period ends for purposes of reinstating the DNAR order. 10. Patients having a sterilization procedure: I understand that if the procedure is successful the results will be permanent and it will therefore be impossible for me to inseminate, conceive, or bear children. I also understand that the procedure is intended to result in sterility, although the result has not been guaranteed. 11. I acknowledge that my physician has explained sedation/analgesia administration to me including the risk and benefits I consent to the administration of sedation/analgesia as may be necessary or desirable in the judgment of my physician. I CERTIFY THAT I HAVE READ AND FULLY UNDERSTAND THE ABOVE CONSENT TO OPERATION and/or OTHER PROCEDURE.     _________________________________________ _________________________________     ___________________________________  Signature of Patient     Signature of Responsible Person                   Printed Name of Responsible Person                              _________________________________________ ______________________________        ___________________________________  Signature of Witness         Date  Time         Relationship to Patient    STATEMENT OF PHYSICIAN My signature below affirms that prior to the time of the procedure; I have explained to the patient and/or his/her legal representative, the risks and benefits involved in the proposed treatment and any reasonable alternative to the proposed treatment.  I have also explained the risks and benefits involved in refusal of the proposed treatment and alternatives to the proposed treatment and have answered the patient's questions.  If I have a significant financial interest in a co-management agreement or a significant financial interest in any product or implant, or other significant relationship used in this procedure/surgery, I have disclosed this and had a discussion with my patient.     _______________________________________________________________ _____________________________  (Signature of Physician)                                                                                         (Date)                                   (Time)  Patient Name: Jairo Knight    : 1990   Printed: 5/10/2023      Medical Record #: [id number]                                              Page 1 of 1